# Patient Record
Sex: FEMALE | Race: WHITE | NOT HISPANIC OR LATINO | URBAN - METROPOLITAN AREA
[De-identification: names, ages, dates, MRNs, and addresses within clinical notes are randomized per-mention and may not be internally consistent; named-entity substitution may affect disease eponyms.]

---

## 2023-04-16 ENCOUNTER — INPATIENT (INPATIENT)
Facility: HOSPITAL | Age: 83
LOS: 6 days | Discharge: HOME | End: 2023-04-23
Attending: STUDENT IN AN ORGANIZED HEALTH CARE EDUCATION/TRAINING PROGRAM | Admitting: STUDENT IN AN ORGANIZED HEALTH CARE EDUCATION/TRAINING PROGRAM
Payer: SELF-PAY

## 2023-04-16 VITALS
SYSTOLIC BLOOD PRESSURE: 74 MMHG | TEMPERATURE: 100 F | DIASTOLIC BLOOD PRESSURE: 50 MMHG | HEART RATE: 97 BPM | RESPIRATION RATE: 16 BRPM | OXYGEN SATURATION: 100 %

## 2023-04-16 DIAGNOSIS — Z90.49 ACQUIRED ABSENCE OF OTHER SPECIFIED PARTS OF DIGESTIVE TRACT: Chronic | ICD-10-CM

## 2023-04-16 LAB
ALBUMIN SERPL ELPH-MCNC: 4.9 G/DL — SIGNIFICANT CHANGE UP (ref 3.3–5)
ALP SERPL-CCNC: 82 U/L — SIGNIFICANT CHANGE UP (ref 40–120)
ALT FLD-CCNC: 14 U/L — SIGNIFICANT CHANGE UP (ref 4–33)
ANION GAP SERPL CALC-SCNC: 24 MMOL/L — HIGH (ref 7–14)
AST SERPL-CCNC: 25 U/L — SIGNIFICANT CHANGE UP (ref 4–32)
BASE EXCESS BLDV CALC-SCNC: -10.6 MMOL/L — LOW (ref -2–3)
BILIRUB SERPL-MCNC: 0.6 MG/DL — SIGNIFICANT CHANGE UP (ref 0.2–1.2)
BLOOD GAS VENOUS COMPREHENSIVE RESULT: SIGNIFICANT CHANGE UP
BUN SERPL-MCNC: 47 MG/DL — HIGH (ref 7–23)
CALCIUM SERPL-MCNC: 9.6 MG/DL — SIGNIFICANT CHANGE UP (ref 8.4–10.5)
CHLORIDE BLDV-SCNC: 96 MMOL/L — SIGNIFICANT CHANGE UP (ref 96–108)
CHLORIDE SERPL-SCNC: 93 MMOL/L — LOW (ref 98–107)
CO2 BLDV-SCNC: 16.9 MMOL/L — LOW (ref 22–26)
CO2 SERPL-SCNC: 14 MMOL/L — LOW (ref 22–31)
CREAT SERPL-MCNC: 2.94 MG/DL — HIGH (ref 0.5–1.3)
EGFR: 15 ML/MIN/1.73M2 — LOW
GAS PNL BLDV: 126 MMOL/L — LOW (ref 136–145)
GAS PNL BLDV: SIGNIFICANT CHANGE UP
GLUCOSE BLDV-MCNC: 150 MG/DL — HIGH (ref 70–99)
GLUCOSE SERPL-MCNC: 159 MG/DL — HIGH (ref 70–99)
HCO3 BLDV-SCNC: 16 MMOL/L — LOW (ref 22–29)
HCT VFR BLD CALC: 41.5 % — SIGNIFICANT CHANGE UP (ref 34.5–45)
HCT VFR BLDA CALC: 44 % — SIGNIFICANT CHANGE UP (ref 34.5–46.5)
HGB BLD CALC-MCNC: 14.5 G/DL — SIGNIFICANT CHANGE UP (ref 11.7–16.1)
HGB BLD-MCNC: 13.8 G/DL — SIGNIFICANT CHANGE UP (ref 11.5–15.5)
IANC: 7.5 K/UL — HIGH (ref 1.8–7.4)
LACTATE BLDV-MCNC: 3.3 MMOL/L — HIGH (ref 0.5–2)
LIDOCAIN IGE QN: 21 U/L — SIGNIFICANT CHANGE UP (ref 7–60)
MAGNESIUM SERPL-MCNC: 1.9 MG/DL — SIGNIFICANT CHANGE UP (ref 1.6–2.6)
MCHC RBC-ENTMCNC: 29.2 PG — SIGNIFICANT CHANGE UP (ref 27–34)
MCHC RBC-ENTMCNC: 33.3 GM/DL — SIGNIFICANT CHANGE UP (ref 32–36)
MCV RBC AUTO: 87.9 FL — SIGNIFICANT CHANGE UP (ref 80–100)
PCO2 BLDV: 36 MMHG — LOW (ref 39–52)
PH BLDV: 7.25 — LOW (ref 7.32–7.43)
PHOSPHATE SERPL-MCNC: 4.7 MG/DL — HIGH (ref 2.5–4.5)
PLATELET # BLD AUTO: 127 K/UL — LOW (ref 150–400)
PO2 BLDV: 36 MMHG — SIGNIFICANT CHANGE UP (ref 25–45)
POTASSIUM BLDV-SCNC: 3.4 MMOL/L — LOW (ref 3.5–5.1)
POTASSIUM SERPL-MCNC: 3 MMOL/L — LOW (ref 3.5–5.3)
POTASSIUM SERPL-SCNC: 3 MMOL/L — LOW (ref 3.5–5.3)
PROT SERPL-MCNC: 8.2 G/DL — SIGNIFICANT CHANGE UP (ref 6–8.3)
RBC # BLD: 4.72 M/UL — SIGNIFICANT CHANGE UP (ref 3.8–5.2)
RBC # FLD: 14.5 % — SIGNIFICANT CHANGE UP (ref 10.3–14.5)
SAO2 % BLDV: 50.6 % — LOW (ref 67–88)
SODIUM SERPL-SCNC: 131 MMOL/L — LOW (ref 135–145)
WBC # BLD: 9.76 K/UL — SIGNIFICANT CHANGE UP (ref 3.8–10.5)
WBC # FLD AUTO: 9.76 K/UL — SIGNIFICANT CHANGE UP (ref 3.8–10.5)

## 2023-04-16 PROCEDURE — 99285 EMERGENCY DEPT VISIT HI MDM: CPT

## 2023-04-16 RX ORDER — POTASSIUM CHLORIDE 20 MEQ
40 PACKET (EA) ORAL ONCE
Refills: 0 | Status: COMPLETED | OUTPATIENT
Start: 2023-04-16 | End: 2023-04-16

## 2023-04-16 RX ORDER — SODIUM CHLORIDE 9 MG/ML
1000 INJECTION, SOLUTION INTRAVENOUS ONCE
Refills: 0 | Status: COMPLETED | OUTPATIENT
Start: 2023-04-16 | End: 2023-04-16

## 2023-04-16 RX ORDER — POTASSIUM CHLORIDE 20 MEQ
10 PACKET (EA) ORAL
Refills: 0 | Status: COMPLETED | OUTPATIENT
Start: 2023-04-16 | End: 2023-04-17

## 2023-04-16 RX ORDER — ONDANSETRON 8 MG/1
4 TABLET, FILM COATED ORAL ONCE
Refills: 0 | Status: COMPLETED | OUTPATIENT
Start: 2023-04-16 | End: 2023-04-16

## 2023-04-16 RX ORDER — PIPERACILLIN AND TAZOBACTAM 4; .5 G/20ML; G/20ML
3.38 INJECTION, POWDER, LYOPHILIZED, FOR SOLUTION INTRAVENOUS ONCE
Refills: 0 | Status: COMPLETED | OUTPATIENT
Start: 2023-04-16 | End: 2023-04-16

## 2023-04-16 RX ORDER — ACETAMINOPHEN 500 MG
1000 TABLET ORAL ONCE
Refills: 0 | Status: COMPLETED | OUTPATIENT
Start: 2023-04-16 | End: 2023-04-16

## 2023-04-16 RX ADMIN — Medication 400 MILLIGRAM(S): at 22:45

## 2023-04-16 RX ADMIN — PIPERACILLIN AND TAZOBACTAM 200 GRAM(S): 4; .5 INJECTION, POWDER, LYOPHILIZED, FOR SOLUTION INTRAVENOUS at 22:49

## 2023-04-16 RX ADMIN — ONDANSETRON 4 MILLIGRAM(S): 8 TABLET, FILM COATED ORAL at 22:30

## 2023-04-16 RX ADMIN — SODIUM CHLORIDE 1000 MILLILITER(S): 9 INJECTION, SOLUTION INTRAVENOUS at 22:30

## 2023-04-16 NOTE — ED ADULT NURSE NOTE - CHIEF COMPLAINT QUOTE
Pt c/o non-bloody n/v/d x2 days. Pt just returned from [place] on Saturday. Endorses weakness, decrease po intake. Denies fever, chills, sick contact. PMHx: HTN, Asthma Pt opted for daughter in law to translate in French.  Rn aware

## 2023-04-16 NOTE — ED ADULT NURSE NOTE - OBJECTIVE STATEMENT
Pt received in room 11. Pt is a&ox4, breathing spontaneously and nonlabored, ill in appearance. Pt presents with daughter in law - reports flying here from [place] yesterday, pt has has nausea/vomiting/diarrheas since. Pt has has multiple bouts of diarrhea, up to 10 times today. Pt appears weak, unable to ambulate like normal - no unilateral weakness noted. Skin appropriate for race, skin tenting present. Vitals as noted in chart, ccm shows normal sinus rhythm. Pt denies cp, sob, fevers at home, no known sick contacts. 2 IVs placed, 20G in R and L acs. Labs drawn and sent as per orders. Pt medicated as per orders. Safety precautions in place and to be maintained, will continue to monitor.

## 2023-04-16 NOTE — ED PROVIDER NOTE - OBJECTIVE STATEMENT
83F PMH HTN, asthma p/w diarrhea/vomiting since yest; multiple episodes all nonbloody in nature. Pt flew back from [place] with daughter in law who is also at bedside. No one else with similar symptoms. Pt did not eat anything out of the ordinary recently. No cp/sob, abd pain, leg swelling, urinary symptoms, personal/family hx of clots

## 2023-04-16 NOTE — ED ADULT TRIAGE NOTE - CHIEF COMPLAINT QUOTE
Pt c/o non-bloody n/v/d x2 days. Pt just returned from [place] on Saturday. Endorses weakness, decrease po intake. PMHx: HTN, Asthma Pt opted for daughter in law to translate in [place] of [place].  Rn aware Pt c/o non-bloody n/v/d x2 days. Pt just returned from [place] on Saturday. Endorses weakness, decrease po intake. Denies fever, chills, sick contact. PMHx: HTN, Asthma Pt opted for daughter in law to translate in Azeri.  Rn aware

## 2023-04-16 NOTE — ED PROVIDER NOTE - PHYSICAL EXAMINATION
Physical Exam:  Gen:  awake alert   HEENT: normal conjunctiva, oral mucosa dry  Lung: CTAB, no respiratory distress, no wheezes/rhonchi/rales B/L, speaking in full sentences  CV: RRR  Abd: soft, NT, ND, no guarding, no rigidity, no rebound tenderness  MSK: no visible deformities  Skin: Warm, well perfused, no rash, no leg swelling  ~Tariq Calvillo MD (PGY-3)

## 2023-04-16 NOTE — ED PROVIDER NOTE - ATTENDING CONTRIBUTION TO CARE
Caroline Antonio MD attending physician.  Patient is 83 years old recent return from [place] with her daughter.  Comes in with nausea vomiting and diarrhea.  Abdomen is soft without rebound or guarding was without any blood.  Blood pressure on presentation was 74/50.  Patient with dry mouth appears dehydrated after some hydration blood pressure went into the 90s.  Patient nontoxic-appearing.  She does have a new creatinine that is nearly 3.  Patient needs to stay in the hospital for new SHI in the setting of likely dehydration    I performed a history and physical exam of the patient and discussed their management with the resident and /or advanced care provider. I reviewed the resident and /or ACP's note and agree with the documented findings and plan of care. My medical decison making and observations are found above.

## 2023-04-16 NOTE — ED PROVIDER NOTE - CLINICAL SUMMARY MEDICAL DECISION MAKING FREE TEXT BOX
83F PMH HTN, asthma p/w diarrhea/vomiting since yest; multiple episodes all nonbloody in nature. VS and exam as above. ECG nondiagnostic  Concern for bacterial colitis/enteritis; abd fully NT so low concern for surgical pathology leading to symptoms. Will order sepsis/abdominal labs, ivf, ua/ucx, abx, reassess symptoms 83F PMH HTN, asthma p/w diarrhea/vomiting since yest; multiple episodes all nonbloody in nature. VS and exam as above. ECG nondiagnostic  Concern for bacterial colitis/enteritis; abd fully NT so low concern for surgical pathology leading to symptoms. Will order sepsis/abdominal labs, ivf, ua/ucx, abx, reassess symptoms    Caroline Antonio MD attending physician.  Patient is 83 years old recent return from [place] with her daughter.  Comes in with nausea vomiting and diarrhea.  Abdomen is soft without rebound or guarding was without any blood.  Blood pressure on presentation was 74/50.  Patient with dry mouth appears dehydrated after some hydration blood pressure went into the 90s.  Patient nontoxic-appearing.  She does have a new creatinine that is nearly 3.  Patient needs to stay in the hospital for new SHI in the setting of likely dehydration

## 2023-04-16 NOTE — ED PROVIDER NOTE - PROGRESS NOTE DETAILS
Rajinder BAZAN (PGY-3)  BP improved/Cr downtrending/bicarb improving/AG improving with 2L IVF. Pt getting 3rd L ivf currently and mIVF order placed. will admit to hospitalist

## 2023-04-16 NOTE — ED PROVIDER NOTE - NS ED ROS FT
CONST: +fevers, +chills  ENT: no sore throat  CV: no chest pain, no leg swelling  RESP: no shortness of breath, no cough  ABD: no abdominal pain, +nausea, +vomiting, +diarrhea  : no dysuria, no flank pain, no hematuria  MSK: no back pain, no extremity pain  SKIN:  no rash

## 2023-04-17 DIAGNOSIS — A41.9 SEPSIS, UNSPECIFIED ORGANISM: ICD-10-CM

## 2023-04-17 DIAGNOSIS — Z90.49 ACQUIRED ABSENCE OF OTHER SPECIFIED PARTS OF DIGESTIVE TRACT: ICD-10-CM

## 2023-04-17 DIAGNOSIS — J45.909 UNSPECIFIED ASTHMA, UNCOMPLICATED: ICD-10-CM

## 2023-04-17 DIAGNOSIS — I10 ESSENTIAL (PRIMARY) HYPERTENSION: ICD-10-CM

## 2023-04-17 DIAGNOSIS — Z29.9 ENCOUNTER FOR PROPHYLACTIC MEASURES, UNSPECIFIED: ICD-10-CM

## 2023-04-17 DIAGNOSIS — E87.1 HYPO-OSMOLALITY AND HYPONATREMIA: ICD-10-CM

## 2023-04-17 LAB
ALBUMIN SERPL ELPH-MCNC: 3.7 G/DL — SIGNIFICANT CHANGE UP (ref 3.3–5)
ALP SERPL-CCNC: 62 U/L — SIGNIFICANT CHANGE UP (ref 40–120)
ALT FLD-CCNC: 11 U/L — SIGNIFICANT CHANGE UP (ref 4–33)
ANION GAP SERPL CALC-SCNC: 15 MMOL/L — HIGH (ref 7–14)
ANION GAP SERPL CALC-SCNC: 18 MMOL/L — HIGH (ref 7–14)
ANION GAP SERPL CALC-SCNC: 18 MMOL/L — HIGH (ref 7–14)
ANISOCYTOSIS BLD QL: SLIGHT — SIGNIFICANT CHANGE UP
APPEARANCE UR: ABNORMAL
APTT BLD: 23.9 SEC — LOW (ref 27–36.3)
AST SERPL-CCNC: 21 U/L — SIGNIFICANT CHANGE UP (ref 4–32)
BACTERIA # UR AUTO: NEGATIVE — SIGNIFICANT CHANGE UP
BASE EXCESS BLDV CALC-SCNC: -10.1 MMOL/L — LOW (ref -2–3)
BASE EXCESS BLDV CALC-SCNC: -11.4 MMOL/L — LOW (ref -2–3)
BASOPHILS # BLD AUTO: 0 K/UL — SIGNIFICANT CHANGE UP (ref 0–0.2)
BASOPHILS # BLD AUTO: 0.03 K/UL — SIGNIFICANT CHANGE UP (ref 0–0.2)
BASOPHILS NFR BLD AUTO: 0 % — SIGNIFICANT CHANGE UP (ref 0–2)
BASOPHILS NFR BLD AUTO: 0.4 % — SIGNIFICANT CHANGE UP (ref 0–2)
BILIRUB SERPL-MCNC: 0.6 MG/DL — SIGNIFICANT CHANGE UP (ref 0.2–1.2)
BILIRUB UR-MCNC: NEGATIVE — SIGNIFICANT CHANGE UP
BLOOD GAS VENOUS COMPREHENSIVE RESULT: SIGNIFICANT CHANGE UP
BLOOD GAS VENOUS COMPREHENSIVE RESULT: SIGNIFICANT CHANGE UP
BUN SERPL-MCNC: 44 MG/DL — HIGH (ref 7–23)
BUN SERPL-MCNC: 47 MG/DL — HIGH (ref 7–23)
BUN SERPL-MCNC: 48 MG/DL — HIGH (ref 7–23)
CALCIUM SERPL-MCNC: 8.5 MG/DL — SIGNIFICANT CHANGE UP (ref 8.4–10.5)
CALCIUM SERPL-MCNC: 8.6 MG/DL — SIGNIFICANT CHANGE UP (ref 8.4–10.5)
CALCIUM SERPL-MCNC: 9 MG/DL — SIGNIFICANT CHANGE UP (ref 8.4–10.5)
CHLORIDE BLDV-SCNC: 102 MMOL/L — SIGNIFICANT CHANGE UP (ref 96–108)
CHLORIDE BLDV-SCNC: 98 MMOL/L — SIGNIFICANT CHANGE UP (ref 96–108)
CHLORIDE SERPL-SCNC: 94 MMOL/L — LOW (ref 98–107)
CHLORIDE SERPL-SCNC: 97 MMOL/L — LOW (ref 98–107)
CHLORIDE SERPL-SCNC: 98 MMOL/L — SIGNIFICANT CHANGE UP (ref 98–107)
CO2 BLDV-SCNC: 16.8 MMOL/L — LOW (ref 22–26)
CO2 BLDV-SCNC: 17.3 MMOL/L — LOW (ref 22–26)
CO2 SERPL-SCNC: 11 MMOL/L — LOW (ref 22–31)
CO2 SERPL-SCNC: 15 MMOL/L — LOW (ref 22–31)
CO2 SERPL-SCNC: 15 MMOL/L — LOW (ref 22–31)
COLOR SPEC: YELLOW — SIGNIFICANT CHANGE UP
CREAT ?TM UR-MCNC: 120 MG/DL — SIGNIFICANT CHANGE UP
CREAT SERPL-MCNC: 2.07 MG/DL — HIGH (ref 0.5–1.3)
CREAT SERPL-MCNC: 2.57 MG/DL — HIGH (ref 0.5–1.3)
CREAT SERPL-MCNC: 2.86 MG/DL — HIGH (ref 0.5–1.3)
DIFF PNL FLD: NEGATIVE — SIGNIFICANT CHANGE UP
EGFR: 16 ML/MIN/1.73M2 — LOW
EGFR: 18 ML/MIN/1.73M2 — LOW
EGFR: 23 ML/MIN/1.73M2 — LOW
EOSINOPHIL # BLD AUTO: 0 K/UL — SIGNIFICANT CHANGE UP (ref 0–0.5)
EOSINOPHIL # BLD AUTO: 0 K/UL — SIGNIFICANT CHANGE UP (ref 0–0.5)
EOSINOPHIL NFR BLD AUTO: 0 % — SIGNIFICANT CHANGE UP (ref 0–6)
EOSINOPHIL NFR BLD AUTO: 0 % — SIGNIFICANT CHANGE UP (ref 0–6)
EPI CELLS # UR: 1 /HPF — SIGNIFICANT CHANGE UP (ref 0–5)
FLUAV AG NPH QL: SIGNIFICANT CHANGE UP
FLUBV AG NPH QL: SIGNIFICANT CHANGE UP
GAS PNL BLDV: 127 MMOL/L — LOW (ref 136–145)
GAS PNL BLDV: 127 MMOL/L — LOW (ref 136–145)
GAS PNL BLDV: SIGNIFICANT CHANGE UP
GIANT PLATELETS BLD QL SMEAR: PRESENT — SIGNIFICANT CHANGE UP
GLUCOSE BLDV-MCNC: 125 MG/DL — HIGH (ref 70–99)
GLUCOSE BLDV-MCNC: 144 MG/DL — HIGH (ref 70–99)
GLUCOSE SERPL-MCNC: 124 MG/DL — HIGH (ref 70–99)
GLUCOSE SERPL-MCNC: 130 MG/DL — HIGH (ref 70–99)
GLUCOSE SERPL-MCNC: 152 MG/DL — HIGH (ref 70–99)
GLUCOSE UR QL: NEGATIVE — SIGNIFICANT CHANGE UP
GRAM STN FLD: SIGNIFICANT CHANGE UP
GRAM STN FLD: SIGNIFICANT CHANGE UP
HCO3 BLDV-SCNC: 16 MMOL/L — LOW (ref 22–29)
HCO3 BLDV-SCNC: 16 MMOL/L — LOW (ref 22–29)
HCT VFR BLD CALC: 36.5 % — SIGNIFICANT CHANGE UP (ref 34.5–45)
HCT VFR BLDA CALC: 34 % — LOW (ref 34.5–46.5)
HCT VFR BLDA CALC: 37 % — SIGNIFICANT CHANGE UP (ref 34.5–46.5)
HGB BLD CALC-MCNC: 11.4 G/DL — LOW (ref 11.7–16.1)
HGB BLD CALC-MCNC: 12.2 G/DL — SIGNIFICANT CHANGE UP (ref 11.7–16.1)
HGB BLD-MCNC: 11.8 G/DL — SIGNIFICANT CHANGE UP (ref 11.5–15.5)
HYALINE CASTS # UR AUTO: 2 /LPF — SIGNIFICANT CHANGE UP (ref 0–7)
IANC: 4.59 K/UL — SIGNIFICANT CHANGE UP (ref 1.8–7.4)
IMM GRANULOCYTES NFR BLD AUTO: 0.3 % — SIGNIFICANT CHANGE UP (ref 0–0.9)
INR BLD: 1.09 RATIO — SIGNIFICANT CHANGE UP (ref 0.88–1.16)
KETONES UR-MCNC: NEGATIVE — SIGNIFICANT CHANGE UP
LACTATE BLDV-MCNC: 2.1 MMOL/L — HIGH (ref 0.5–2)
LACTATE BLDV-MCNC: 3.2 MMOL/L — HIGH (ref 0.5–2)
LEUKOCYTE ESTERASE UR-ACNC: NEGATIVE — SIGNIFICANT CHANGE UP
LYMPHOCYTES # BLD AUTO: 1.1 K/UL — SIGNIFICANT CHANGE UP (ref 1–3.3)
LYMPHOCYTES # BLD AUTO: 1.53 K/UL — SIGNIFICANT CHANGE UP (ref 1–3.3)
LYMPHOCYTES # BLD AUTO: 11.3 % — LOW (ref 13–44)
LYMPHOCYTES # BLD AUTO: 22.9 % — SIGNIFICANT CHANGE UP (ref 13–44)
MACROCYTES BLD QL: SLIGHT — SIGNIFICANT CHANGE UP
MAGNESIUM SERPL-MCNC: 1.8 MG/DL — SIGNIFICANT CHANGE UP (ref 1.6–2.6)
MANUAL SMEAR VERIFICATION: SIGNIFICANT CHANGE UP
MCHC RBC-ENTMCNC: 28.5 PG — SIGNIFICANT CHANGE UP (ref 27–34)
MCHC RBC-ENTMCNC: 32.3 GM/DL — SIGNIFICANT CHANGE UP (ref 32–36)
MCV RBC AUTO: 88.2 FL — SIGNIFICANT CHANGE UP (ref 80–100)
METAMYELOCYTES # FLD: 1.7 % — HIGH (ref 0–1)
METHOD TYPE: SIGNIFICANT CHANGE UP
MONOCYTES # BLD AUTO: 0.51 K/UL — SIGNIFICANT CHANGE UP (ref 0–0.9)
MONOCYTES # BLD AUTO: 0.85 K/UL — SIGNIFICANT CHANGE UP (ref 0–0.9)
MONOCYTES NFR BLD AUTO: 7.6 % — SIGNIFICANT CHANGE UP (ref 2–14)
MONOCYTES NFR BLD AUTO: 8.7 % — SIGNIFICANT CHANGE UP (ref 2–14)
NEUTROPHILS # BLD AUTO: 4.59 K/UL — SIGNIFICANT CHANGE UP (ref 1.8–7.4)
NEUTROPHILS # BLD AUTO: 6.96 K/UL — SIGNIFICANT CHANGE UP (ref 1.8–7.4)
NEUTROPHILS NFR BLD AUTO: 40.9 % — LOW (ref 43–77)
NEUTROPHILS NFR BLD AUTO: 68.8 % — SIGNIFICANT CHANGE UP (ref 43–77)
NEUTS BAND # BLD: 30.4 % — CRITICAL HIGH (ref 0–6)
NITRITE UR-MCNC: NEGATIVE — SIGNIFICANT CHANGE UP
NRBC # BLD: 0 /100 WBCS — SIGNIFICANT CHANGE UP (ref 0–0)
NRBC # FLD: 0 K/UL — SIGNIFICANT CHANGE UP (ref 0–0)
OSMOLALITY UR: 431 MOSM/KG — SIGNIFICANT CHANGE UP (ref 50–1200)
PCO2 BLDV: 36 MMHG — LOW (ref 39–52)
PCO2 BLDV: 38 MMHG — LOW (ref 39–52)
PH BLDV: 7.22 — LOW (ref 7.32–7.43)
PH BLDV: 7.26 — LOW (ref 7.32–7.43)
PH UR: 6 — SIGNIFICANT CHANGE UP (ref 5–8)
PHOSPHATE SERPL-MCNC: 4.6 MG/DL — HIGH (ref 2.5–4.5)
PLAT MORPH BLD: NORMAL — SIGNIFICANT CHANGE UP
PLATELET # BLD AUTO: 109 K/UL — LOW (ref 150–400)
PLATELET COUNT - ESTIMATE: ABNORMAL
PO2 BLDV: 32 MMHG — SIGNIFICANT CHANGE UP (ref 25–45)
PO2 BLDV: 60 MMHG — HIGH (ref 25–45)
POLYCHROMASIA BLD QL SMEAR: SLIGHT — SIGNIFICANT CHANGE UP
POTASSIUM BLDV-SCNC: 3.1 MMOL/L — LOW (ref 3.5–5.1)
POTASSIUM BLDV-SCNC: 3.6 MMOL/L — SIGNIFICANT CHANGE UP (ref 3.5–5.1)
POTASSIUM SERPL-MCNC: 3 MMOL/L — LOW (ref 3.5–5.3)
POTASSIUM SERPL-MCNC: 3.1 MMOL/L — LOW (ref 3.5–5.3)
POTASSIUM SERPL-MCNC: 3.5 MMOL/L — SIGNIFICANT CHANGE UP (ref 3.5–5.3)
POTASSIUM SERPL-SCNC: 3 MMOL/L — LOW (ref 3.5–5.3)
POTASSIUM SERPL-SCNC: 3.1 MMOL/L — LOW (ref 3.5–5.3)
POTASSIUM SERPL-SCNC: 3.5 MMOL/L — SIGNIFICANT CHANGE UP (ref 3.5–5.3)
PROT SERPL-MCNC: 6.6 G/DL — SIGNIFICANT CHANGE UP (ref 6–8.3)
PROT UR-MCNC: ABNORMAL
PROTHROM AB SERPL-ACNC: 12.7 SEC — SIGNIFICANT CHANGE UP (ref 10.5–13.4)
RBC # BLD: 4.14 M/UL — SIGNIFICANT CHANGE UP (ref 3.8–5.2)
RBC # FLD: 14.4 % — SIGNIFICANT CHANGE UP (ref 10.3–14.5)
RBC BLD AUTO: ABNORMAL
RBC CASTS # UR COMP ASSIST: 3 /HPF — SIGNIFICANT CHANGE UP (ref 0–4)
RSV RNA NPH QL NAA+NON-PROBE: SIGNIFICANT CHANGE UP
SALMONELLA DNA BLD POS QL NAA+NON-PROBE: SIGNIFICANT CHANGE UP
SAO2 % BLDV: 51.9 % — LOW (ref 67–88)
SAO2 % BLDV: 89.1 % — HIGH (ref 67–88)
SARS-COV-2 RNA SPEC QL NAA+PROBE: SIGNIFICANT CHANGE UP
SODIUM SERPL-SCNC: 127 MMOL/L — LOW (ref 135–145)
SODIUM UR-SCNC: 26 MMOL/L — SIGNIFICANT CHANGE UP
SP GR SPEC: 1.02 — SIGNIFICANT CHANGE UP (ref 1.01–1.05)
SPECIMEN SOURCE: SIGNIFICANT CHANGE UP
SPECIMEN SOURCE: SIGNIFICANT CHANGE UP
UROBILINOGEN FLD QL: SIGNIFICANT CHANGE UP
VARIANT LYMPHS # BLD: 7 % — HIGH (ref 0–6)
WBC # BLD: 6.68 K/UL — SIGNIFICANT CHANGE UP (ref 3.8–10.5)
WBC # FLD AUTO: 6.68 K/UL — SIGNIFICANT CHANGE UP (ref 3.8–10.5)
WBC UR QL: 6 /HPF — HIGH (ref 0–5)

## 2023-04-17 PROCEDURE — 12345: CPT | Mod: NC

## 2023-04-17 PROCEDURE — 99223 1ST HOSP IP/OBS HIGH 75: CPT

## 2023-04-17 PROCEDURE — 99283 EMERGENCY DEPT VISIT LOW MDM: CPT | Mod: GC

## 2023-04-17 PROCEDURE — 74176 CT ABD & PELVIS W/O CONTRAST: CPT | Mod: 26

## 2023-04-17 RX ORDER — DORZOLAMIDE HYDROCHLORIDE TIMOLOL MALEATE 20; 5 MG/ML; MG/ML
1 SOLUTION/ DROPS OPHTHALMIC
Refills: 0 | Status: DISCONTINUED | OUTPATIENT
Start: 2023-04-17 | End: 2023-04-23

## 2023-04-17 RX ORDER — DORZOLAMIDE HYDROCHLORIDE TIMOLOL MALEATE 20; 5 MG/ML; MG/ML
1 SOLUTION/ DROPS OPHTHALMIC
Refills: 0 | DISCHARGE

## 2023-04-17 RX ORDER — ACETAMINOPHEN 500 MG
650 TABLET ORAL EVERY 6 HOURS
Refills: 0 | Status: DISCONTINUED | OUTPATIENT
Start: 2023-04-17 | End: 2023-04-23

## 2023-04-17 RX ORDER — POTASSIUM CHLORIDE 20 MEQ
40 PACKET (EA) ORAL EVERY 4 HOURS
Refills: 0 | Status: DISCONTINUED | OUTPATIENT
Start: 2023-04-17 | End: 2023-04-17

## 2023-04-17 RX ORDER — VANCOMYCIN HCL 1 G
1250 VIAL (EA) INTRAVENOUS ONCE
Refills: 0 | Status: COMPLETED | OUTPATIENT
Start: 2023-04-17 | End: 2023-04-17

## 2023-04-17 RX ORDER — VANCOMYCIN HCL 1 G
1250 VIAL (EA) INTRAVENOUS EVERY 12 HOURS
Refills: 0 | Status: DISCONTINUED | OUTPATIENT
Start: 2023-04-17 | End: 2023-04-17

## 2023-04-17 RX ORDER — MONTELUKAST 4 MG/1
10 TABLET, CHEWABLE ORAL DAILY
Refills: 0 | Status: DISCONTINUED | OUTPATIENT
Start: 2023-04-17 | End: 2023-04-23

## 2023-04-17 RX ORDER — SODIUM CHLORIDE 9 MG/ML
500 INJECTION INTRAMUSCULAR; INTRAVENOUS; SUBCUTANEOUS ONCE
Refills: 0 | Status: COMPLETED | OUTPATIENT
Start: 2023-04-17 | End: 2023-04-17

## 2023-04-17 RX ORDER — DICLOFENAC SODIUM 75 MG/1
1 TABLET, DELAYED RELEASE ORAL
Refills: 0 | DISCHARGE

## 2023-04-17 RX ORDER — POTASSIUM CHLORIDE 20 MEQ
10 PACKET (EA) ORAL
Refills: 0 | Status: COMPLETED | OUTPATIENT
Start: 2023-04-17 | End: 2023-04-18

## 2023-04-17 RX ORDER — SODIUM CHLORIDE 9 MG/ML
1000 INJECTION, SOLUTION INTRAVENOUS
Refills: 0 | Status: DISCONTINUED | OUTPATIENT
Start: 2023-04-17 | End: 2023-04-18

## 2023-04-17 RX ORDER — IPRATROPIUM/ALBUTEROL SULFATE 18-103MCG
3 AEROSOL WITH ADAPTER (GRAM) INHALATION EVERY 6 HOURS
Refills: 0 | Status: DISCONTINUED | OUTPATIENT
Start: 2023-04-17 | End: 2023-04-23

## 2023-04-17 RX ORDER — LANOLIN ALCOHOL/MO/W.PET/CERES
3 CREAM (GRAM) TOPICAL AT BEDTIME
Refills: 0 | Status: DISCONTINUED | OUTPATIENT
Start: 2023-04-17 | End: 2023-04-23

## 2023-04-17 RX ORDER — PIPERACILLIN AND TAZOBACTAM 4; .5 G/20ML; G/20ML
3.38 INJECTION, POWDER, LYOPHILIZED, FOR SOLUTION INTRAVENOUS EVERY 12 HOURS
Refills: 0 | Status: DISCONTINUED | OUTPATIENT
Start: 2023-04-17 | End: 2023-04-18

## 2023-04-17 RX ORDER — ONDANSETRON 8 MG/1
4 TABLET, FILM COATED ORAL EVERY 8 HOURS
Refills: 0 | Status: DISCONTINUED | OUTPATIENT
Start: 2023-04-17 | End: 2023-04-23

## 2023-04-17 RX ORDER — SODIUM CHLORIDE 9 MG/ML
1000 INJECTION, SOLUTION INTRAVENOUS ONCE
Refills: 0 | Status: COMPLETED | OUTPATIENT
Start: 2023-04-17 | End: 2023-04-17

## 2023-04-17 RX ORDER — HEPARIN SODIUM 5000 [USP'U]/ML
5000 INJECTION INTRAVENOUS; SUBCUTANEOUS EVERY 8 HOURS
Refills: 0 | Status: DISCONTINUED | OUTPATIENT
Start: 2023-04-17 | End: 2023-04-23

## 2023-04-17 RX ORDER — MONTELUKAST 4 MG/1
1 TABLET, CHEWABLE ORAL
Refills: 0 | DISCHARGE

## 2023-04-17 RX ADMIN — Medication 3 MILLILITER(S): at 10:02

## 2023-04-17 RX ADMIN — Medication 3 MILLILITER(S): at 05:09

## 2023-04-17 RX ADMIN — HEPARIN SODIUM 5000 UNIT(S): 5000 INJECTION INTRAVENOUS; SUBCUTANEOUS at 13:06

## 2023-04-17 RX ADMIN — Medication 166.67 MILLIGRAM(S): at 05:48

## 2023-04-17 RX ADMIN — PIPERACILLIN AND TAZOBACTAM 25 GRAM(S): 4; .5 INJECTION, POWDER, LYOPHILIZED, FOR SOLUTION INTRAVENOUS at 17:32

## 2023-04-17 RX ADMIN — Medication 40 MILLIEQUIVALENT(S): at 00:34

## 2023-04-17 RX ADMIN — Medication 100 MILLIEQUIVALENT(S): at 03:05

## 2023-04-17 RX ADMIN — Medication 40 MILLIEQUIVALENT(S): at 17:38

## 2023-04-17 RX ADMIN — HEPARIN SODIUM 5000 UNIT(S): 5000 INJECTION INTRAVENOUS; SUBCUTANEOUS at 07:21

## 2023-04-17 RX ADMIN — PIPERACILLIN AND TAZOBACTAM 25 GRAM(S): 4; .5 INJECTION, POWDER, LYOPHILIZED, FOR SOLUTION INTRAVENOUS at 07:21

## 2023-04-17 RX ADMIN — MONTELUKAST 10 MILLIGRAM(S): 4 TABLET, CHEWABLE ORAL at 11:45

## 2023-04-17 RX ADMIN — SODIUM CHLORIDE 1000 MILLILITER(S): 9 INJECTION, SOLUTION INTRAVENOUS at 00:34

## 2023-04-17 RX ADMIN — Medication 100 MILLIEQUIVALENT(S): at 00:34

## 2023-04-17 RX ADMIN — Medication 3 MILLILITER(S): at 16:38

## 2023-04-17 RX ADMIN — Medication 100 MILLIEQUIVALENT(S): at 22:23

## 2023-04-17 RX ADMIN — DORZOLAMIDE HYDROCHLORIDE TIMOLOL MALEATE 1 DROP(S): 20; 5 SOLUTION/ DROPS OPHTHALMIC at 18:14

## 2023-04-17 RX ADMIN — SODIUM CHLORIDE 1000 MILLILITER(S): 9 INJECTION, SOLUTION INTRAVENOUS at 00:03

## 2023-04-17 RX ADMIN — SODIUM CHLORIDE 75 MILLILITER(S): 9 INJECTION, SOLUTION INTRAVENOUS at 02:36

## 2023-04-17 RX ADMIN — Medication 100 MILLIEQUIVALENT(S): at 01:56

## 2023-04-17 RX ADMIN — SODIUM CHLORIDE 750 MILLILITER(S): 9 INJECTION INTRAMUSCULAR; INTRAVENOUS; SUBCUTANEOUS at 05:09

## 2023-04-17 RX ADMIN — Medication 3 MILLILITER(S): at 22:51

## 2023-04-17 NOTE — CONSULT NOTE ADULT - ATTENDING COMMENTS
83-year-old woman with a past medical history of asthma hypertension presents with diarrhea and vomiting x2 days.  MICU consulted for hypotension.  Patient recently returned from [place].   The day she arrived she started having multiple episodes of emesis and diarrhea.  Emesis and diarrhea both nonbloody.  Diarrhea is described as brown.  Patient was having difficulty tolerating p.o. intake and so family brought her to the emergency room.      Labs are significant for leukocytosis with significant bandemia.  She also has a metabolic acidosis, hyponatremia and SHI.       On exam she is awake, alert, conversing fluently and Polish.  Of note preferred her daughter to translate in English.  She is breathing normally on room air.  No signs of respiratory distress.  Abdomen is soft, nontender nondistended.  No rebound or guarding.  Extremities are warm.  Her blood pressure upon our evaluation remained 90s to 100s systolic.  Map was always greater than 65.      CAT scans were done pending official read.   Her presentation is concerning for sepsis likely GI source.  While she was initially hypotensive her blood pressure has improved status post 3.5 L IV fluid in the emergency room.  Of note nurse and family both attest that even while admitted she is continue to have copious diarrhea.  Suspect she is hypovolemic.  Would continue IV fluid repletion.  Agree with broad-spectrum antibiotics.      At this time she does not have any ICU requirements.   Please reconsult ICU if clinical condition worsens or changes.

## 2023-04-17 NOTE — PROGRESS NOTE ADULT - PROBLEM SELECTOR PLAN 1
-Dx: Septic shock 2/2 infection vs hypovolemia  -s/p 4 x 1L bolus, MAP 50-60 despite aggressive IVF given earlier in ED  -Noted lactic acidosis   -s/p vanc/zosyn in ED  -MICU consulted for hypotension; at this time not MICU candidate  -CT A/P w/o colitis, noted pelvic fluid mass (potential infectious vs neoplastic source)  > c/w zosyn  > f/u MRSA, UA, stool studies  > consulted gyn for pelvic fluid collection    > ordered pelvic US per recs -Dx: Septic shock 2/2 infection vs hypovolemia  -s/p 4 x 1L bolus, MAP 50-60 despite aggressive IVF given earlier in ED  -Noted lactic acidosis   -s/p vanc/zosyn in ED  -MICU consulted for hypotension; at this time not MICU candidate  -CT A/P w/o colitis, noted pelvic fluid mass (potential infectious vs neoplastic source)  > c/w zosyn  > f/u MRSA, UCx, GI stool PCR, BCx, UA  > consulted gyn for pelvic fluid collection    > ordered pelvic US per recs

## 2023-04-17 NOTE — PROGRESS NOTE ADULT - SUBJECTIVE AND OBJECTIVE BOX
Robi Garcia  PGY-1 Resident Physician     Patient is a 83y old  Female who presents with a chief complaint of Diarrhea, vomiting (17 Apr 2023 06:52)    SUBJECTIVE / OVERNIGHT EVENTS:  NAEON. Daughter by bedside to translate. Pt denies confusion, fatigue, CP, SOB, GI distress, LE swelling. Remaining ROS (-).     MEDICATIONS  (STANDING):  albuterol/ipratropium for Nebulization 3 milliLiter(s) Nebulizer every 6 hours  dextrose 5% + lactated ringers. 1000 milliLiter(s) (75 mL/Hr) IV Continuous <Continuous>  dorzolamide 2%/timolol 0.5% Ophthalmic Solution 1 Drop(s) Both EYES two times a day  heparin   Injectable 5000 Unit(s) SubCutaneous every 8 hours  montelukast 10 milliGRAM(s) Oral daily  piperacillin/tazobactam IVPB.. 3.375 Gram(s) IV Intermittent every 12 hours    MEDICATIONS  (PRN):  acetaminophen     Tablet .. 650 milliGRAM(s) Oral every 6 hours PRN Temp greater or equal to 38C (100.4F), Mild Pain (1 - 3)  aluminum hydroxide/magnesium hydroxide/simethicone Suspension 30 milliLiter(s) Oral every 4 hours PRN Dyspepsia  melatonin 3 milliGRAM(s) Oral at bedtime PRN Insomnia  ondansetron Injectable 4 milliGRAM(s) IV Push every 8 hours PRN Nausea and/or Vomiting    Allergies    No Known Allergies    Intolerances        Vital Signs Last 24 Hrs  T(C): 36.3 (17 Apr 2023 14:00), Max: 38.3 (16 Apr 2023 22:30)  T(F): 97.3 (17 Apr 2023 14:00), Max: 100.9 (16 Apr 2023 22:30)  HR: 82 (17 Apr 2023 14:00) (73 - 101)  BP: 102/50 (17 Apr 2023 14:00) (74/50 - 113/47)  BP(mean): 67 (17 Apr 2023 05:47) (56 - 67)  RR: 18 (17 Apr 2023 14:00) (16 - 18)  SpO2: 96% (17 Apr 2023 14:00) (95% - 100%)    Parameters below as of 17 Apr 2023 14:00  Patient On (Oxygen Delivery Method): room air      Daily     Daily   I&O's Summary      Physical Exam  GENERAL: NAD. Well-developed.  NEUROLOGICAL: A&O x 4. CN2-12. Strength, Sensation, ROM wnl. Brachial, ankle, babinski reflex wnl. Cerebellar signs (FTN) wnl. Gait appreciated.                  NECK: No lymphadenopathy. Supple, symmetric and without tracheal deviation.   CARDIAC: RRR w/ normal S1 & S2. No murmurs, rubs. & gallops. Radial & dorsal pedis pulses intact. No carotid bruits.   PULMONARY: CTA b/l w/o accessory muscle use.   GI: Soft, NT, ND, no rebound, no guarding. NABS in 4 quads.   RENAL: No lower extremity edema. No CVA tenderness.       DIAGNOSTICS:                         11.8   6.68  )-----------( 109      ( 17 Apr 2023 05:53 )             36.5     Hgb Trend: 11.8<--, 13.8<--  04-17    127<L>  |  98  |  48<H>  ----------------------------<  130<H>  3.5   |  11<L>  |  2.57<H>    Ca    8.5      17 Apr 2023 05:53  Phos  4.6     04-17  Mg     1.80     04-17    TPro  6.6  /  Alb  3.7  /  TBili  0.6  /  DBili  x   /  AST  21  /  ALT  11  /  AlkPhos  62  04-17    CAPILLARY BLOOD GLUCOSE      POCT Blood Glucose.: 156 mg/dL (16 Apr 2023 21:28)    Creatinine Trend: 2.57<--, 2.86<--, 2.94<--  LIVER FUNCTIONS - ( 17 Apr 2023 05:53 )  Alb: 3.7 g/dL / Pro: 6.6 g/dL / ALK PHOS: 62 U/L / ALT: 11 U/L / AST: 21 U/L / GGT: x           PT/INR - ( 17 Apr 2023 05:53 )   PT: 12.7 sec;   INR: 1.09 ratio         PTT - ( 17 Apr 2023 05:53 )  PTT:23.9 sec

## 2023-04-17 NOTE — PATIENT PROFILE ADULT - FALL HARM RISK - HARM RISK INTERVENTIONS

## 2023-04-17 NOTE — H&P ADULT - NSHPREVIEWOFSYSTEMS_GEN_ALL_CORE
REVIEW OF SYSTEMS:    CONSTITUTIONAL: +++weakness, but no fevers or chills  EYES/ENT: No visual changes;  No dysphagia  NECK: No pain or stiffness  RESPIRATORY: No cough, wheezing, hemoptysis; No shortness of breath  CARDIOVASCULAR: No chest pain or palpitations; No lower extremity edema  GASTROINTESTINAL: No abdominal or epigastric pain. ++nausea, +++vomiting, ---hematemesis; No diarrhea or constipation. No melena or hematochezia.  GENITOURINARY: No dysuria, frequency or hematuria  NEUROLOGICAL: No numbness or weakness  SKIN: No itching, burning, rashes, or lesions   PSYCH: No auditory or visual hallucinations  All other review of systems is negative unless indicated above.

## 2023-04-17 NOTE — CONSULT NOTE ADULT - SUBJECTIVE AND OBJECTIVE BOX
CHIEF COMPLAINT:    HPI:  83F PMH HTN and asthma presents with acute onset diarrhea and vomiting each 10 times already since yesterday (Sunday). Patient arrived to [place] from [place] on Saturday. She was otherwise in her usual state of health and rest of family was doing ok. Daughter in law at bedside to assist with translation. Patient did not eat anything out of the ordinary recently and suspects it was something in the airplane. Her vomitus was nonbloody and nonbilious and has passed gas yesterday. Patient denies chest pain, dyspnea, wheezing, cough, fever, chills, leg swelling, abdominal pain or headaches.  In the ED, 74/50 -> 94/48, T 100.9. 74/50 -> 94/48, T 100.9, bandemia of 33%, lactic acidosis without much response to IVF. BP has not improved at all however patient stating she feels well currently. Given Zosyn in ED. CT A/P pending     MICU was consulted regarding patient's hypotension i/s/o vomiting/diarrhea, poss. infectious etiology      PAST MEDICAL & SURGICAL HISTORY:  HTN (hypertension)      Asthma      S/P cholecystectomy          FAMILY HISTORY:  No pertinent family history in first degree relatives        SOCIAL HISTORY:  Smoking: Former smoker  EtOH Use:  Marital Status:    Allergies    No Known Allergies    Intolerances        HOME MEDICATIONS:    REVIEW OF SYSTEMS:    CONSTITUTIONAL: No weakness, fevers, +chills  EYES/ENT: No visual changes;  No vertigo or throat pain   NECK: No pain or stiffness  RESPIRATORY: No cough, wheezing, hemoptysis; No shortness of breath  CARDIOVASCULAR: No chest pain or palpitations  GASTROINTESTINAL: No abdominal or epigastric pain. No nausea, vomiting, or hematemesis; +diarrhea NBNB, No melena or hematochezia.  GENITOURINARY: No dysuria, frequency or hematuria  NEUROLOGICAL: No numbness or weakness  SKIN: No itching, rashes      OBJECTIVE:  ICU Vital Signs Last 24 Hrs  T(C): 36.1 (17 Apr 2023 06:49), Max: 38.3 (16 Apr 2023 22:30)  T(F): 97 (17 Apr 2023 06:49), Max: 100.9 (16 Apr 2023 22:30)  HR: 101 (17 Apr 2023 06:49) (73 - 101)  BP: 113/47 (17 Apr 2023 06:49) (74/50 - 113/47)  BP(mean): 67 (17 Apr 2023 05:47) (56 - 67)  ABP: --  ABP(mean): --  RR: 18 (17 Apr 2023 06:49) (16 - 18)  SpO2: 100% (17 Apr 2023 06:49) (95% - 100%)    O2 Parameters below as of 17 Apr 2023 06:49  Patient On (Oxygen Delivery Method): room air              CAPILLARY BLOOD GLUCOSE      POCT Blood Glucose.: 156 mg/dL (16 Apr 2023 21:28)      VITALS:   T(C): 36.1 (04-17-23 @ 06:49), Max: 38.3 (04-16-23 @ 22:30)  HR: 101 (04-17-23 @ 06:49) (73 - 101)  BP: 113/47 (04-17-23 @ 06:49) (74/50 - 113/47)  RR: 18 (04-17-23 @ 06:49) (16 - 18)  SpO2: 100% (04-17-23 @ 06:49) (95% - 100%)    GENERAL: NAD, lying in bed comfortably  HEAD:  Atraumatic, normocephalic  EYES: EOMI, PERRLA, conjunctiva and sclera clear  ENT: Moist mucous membranes  NECK: Supple, no JVD  HEART: Regular rate and rhythm, no murmurs, rubs, or gallops  LUNGS: Unlabored respirations.  Clear to auscultation bilaterally, no crackles, wheezing, or rhonchi  ABDOMEN: Soft, nontender, nondistended, +BS  EXTREMITIES: 2+ peripheral pulses bilaterally. No clubbing, cyanosis, or edema  NERVOUS SYSTEM:  A&Ox3, no focal deficits   SKIN: No rashes or lesions    HOSPITAL MEDICATIONS:  MEDICATIONS  (STANDING):  albuterol/ipratropium for Nebulization 3 milliLiter(s) Nebulizer every 6 hours  dextrose 5% + lactated ringers. 1000 milliLiter(s) (75 mL/Hr) IV Continuous <Continuous>  dorzolamide 2%/timolol 0.5% Ophthalmic Solution 1 Drop(s) Both EYES two times a day  heparin   Injectable 5000 Unit(s) SubCutaneous every 8 hours  montelukast 10 milliGRAM(s) Oral daily  piperacillin/tazobactam IVPB.. 3.375 Gram(s) IV Intermittent every 12 hours    MEDICATIONS  (PRN):  acetaminophen     Tablet .. 650 milliGRAM(s) Oral every 6 hours PRN Temp greater or equal to 38C (100.4F), Mild Pain (1 - 3)  aluminum hydroxide/magnesium hydroxide/simethicone Suspension 30 milliLiter(s) Oral every 4 hours PRN Dyspepsia  melatonin 3 milliGRAM(s) Oral at bedtime PRN Insomnia  ondansetron Injectable 4 milliGRAM(s) IV Push every 8 hours PRN Nausea and/or Vomiting      LABS:                        11.8   6.68  )-----------( 109      ( 17 Apr 2023 05:53 )             36.5     04-17    127<L>  |  98  |  48<H>  ----------------------------<  130<H>  3.5   |  11<L>  |  2.57<H>    Ca    8.5      17 Apr 2023 05:53  Phos  4.6     04-17  Mg     1.80     04-17    TPro  6.6  /  Alb  3.7  /  TBili  0.6  /  DBili  x   /  AST  21  /  ALT  11  /  AlkPhos  62  04-17    PT/INR - ( 17 Apr 2023 05:53 )   PT: 12.7 sec;   INR: 1.09 ratio         PTT - ( 17 Apr 2023 05:53 )  PTT:23.9 sec      Venous Blood Gas:  04-17 @ 05:53  7.22/38/60/16/89.1  VBG Lactate: 2.1  Venous Blood Gas:  04-17 @ 00:09  7.26/36/32/16/51.9  VBG Lactate: 3.2  Venous Blood Gas:  04-16 @ 21:58  7.25/36/36/16/50.6  VBG Lactate: 3.3      MICROBIOLOGY:     RADIOLOGY:  [ ] Reviewed and interpreted by me    EKG:

## 2023-04-17 NOTE — H&P ADULT - PROBLEM SELECTOR PLAN 1
Patient's BP barely responded to IVF and currently between MAP 50-60 despite aggressive IVF given earlier in ED. minor improvements in lactic acidosis. Patient otherwise appears cognitively intact with good distal perfusion  -Pending CT A/P to isolate source of sepsis but likely GI in nature and possibly viral but cannot rule out salmonella vs staph  -Will start Vanco and continue Zosyn renally dosed  -Continue IVF  -MICU consulted for pressor support  -Repeat Labs including lactic acidosis

## 2023-04-17 NOTE — H&P ADULT - NSHPLABSRESULTS_GEN_ALL_CORE
04-17    127<L>  |  94<L>  |  47<H>  ----------------------------<  152<H>  3.1<L>   |  15<L>  |  2.86<H>    Ca    8.6      17 Apr 2023 00:09  Phos  4.7     04-16  Mg     1.90     04-16    TPro  8.2  /  Alb  4.9  /  TBili  0.6  /  DBili  x   /  AST  25  /  ALT  14  /  AlkPhos  82  04-16                        13.8   9.76  )-----------( 127      ( 16 Apr 2023 21:58 )             41.5     LIVER FUNCTIONS - ( 16 Apr 2023 21:58 )  Alb: 4.9 g/dL / Pro: 8.2 g/dL / ALK PHOS: 82 U/L / ALT: 14 U/L / AST: 25 U/L / GGT: x           EKG: NSR, [id number]    Image: Pending CT A/P

## 2023-04-17 NOTE — ED ADULT NURSE REASSESSMENT NOTE - NS ED NURSE REASSESS COMMENT FT1
MICU at bedside for evaluation. Patient rejected to go to MICU. tolerating fluids well. with improvement to BP. Antibiotics given as per MD order. repeat Stat labs sent. No complaints of chest pain, headache, nausea, dizziness, vomiting  SOB, fever, chills verbalized..
Patient received back from CT. Hypotensive as per flowsheet. MD dye made aware, Medications and fluids given as per MD order. No complaints of chest pain, headache, nausea, dizziness, vomiting  SOB, fever, chills verbalized. maintenance fluids infusing. NSR on cardiac monitor. safety measures in place awaiting further orders
Patient resting comfortably at this time. awaiting CT scan prior to admission bed upstairs. fluids running, respirations even and unlabored, appears in NAD. NSR on cardiac monitor.
Pt admitted to medicine. Pending inpatient bed assignment.
Report received from MELINA Martinez. Pt A&Ox4, respirations equal and unlabored. Pt hypotensive. MD aware. IVF infusing. NSR on cardiac monitor.  Pt denies dizziness, blurry vision, lightheadedness. No acute distress noted. Pending CT scan. Safety maintained.

## 2023-04-17 NOTE — H&P ADULT - PROBLEM SELECTOR PLAN 5
FENP: No IVF, Lytes PRN, Clear diet, heparin ppx    I explained to the family at bedside that her BP is really low and that she is in shock. Will need ICU eval

## 2023-04-17 NOTE — CONSULT NOTE ADULT - ASSESSMENT
83F PMH HTN and asthma presents with acute onset watery diarrhea and NBNB vomiting each 10 times already since yesterday (Sunday) after returning from [place] last Friday now found to be febrile hypotensive in ED concerning for septic shock. MICU consulted for evaluation of patient's hypotension    #Hypotension i/s/o hypovolemia and sepsis  -pt. with 2 day hx vomiting and diarrhea after returning from [place]  -Labs showing bandemia 33%, patient febrile 100.9, possibly GI infection  -pt. s/p 3.5L fluids with improvement in BP  -pt. on zosyn and vancomycin    Recommendations  -c/w fluid resuscitation, patient responsive to boluses  -C/w infectious workup - blood cultures, stool studies/culture/GI PCR, UA/UCx  -c/w antibiotics      Patient is not a MICU candidate at this time. Please re-consult MICU if patient's condition changes or for any further questions.

## 2023-04-17 NOTE — H&P ADULT - PROBLEM SELECTOR PLAN 3
Currently with some wheezing and some basilar crackles without respiratory distress  -Will continue duonbes q6h standing  -if symptoms worsen, will consider systemic steroids but trying to avoid in setting of septic shock

## 2023-04-17 NOTE — H&P ADULT - HISTORY OF PRESENT ILLNESS
83F PMH HTN and asthma presents with acute onset diarrhea and vomiting each 10 times already since yesterday (Sunday). Patient arrived to [place] from [place] on Saturday. She was otherwise in her usual state of health and rest of family was doing ok. Daughter in law at bedside to assist with translation. Patient did not eat anything out of the ordinary recently and suspects it was something in the airplane. Her vomitus was nonbloody and nonbilious and has passed gas yesterday. Patient denies chest pain, dyspnea, wheezing, cough, fever, chills, leg swelling, abdominal pain or headaches.  In the ED, 74/50 -> 94/48, T 100.9. 74/50 -> 94/48, T 100.9, bandemia of 33%, lactic acidosis without much response to IVF. BP has not improved at all however patient stating she feels well currently. Given Zosyn in ED. 83F PMH HTN and asthma presents with acute onset diarrhea and vomiting each 10 times already since yesterday (Sunday). Patient arrived to [place] from [place] on Saturday. She was otherwise in her usual state of health and rest of family was doing ok. Daughter in law at bedside to assist with translation. Patient did not eat anything out of the ordinary recently and suspects it was something in the airplane. Her vomitus was nonbloody and nonbilious and has passed gas yesterday. Patient denies chest pain, dyspnea, wheezing, cough, fever, chills, leg swelling, abdominal pain or headaches.  In the ED, 74/50 -> 94/48, T 100.9. 74/50 -> 94/48, T 100.9, bandemia of 33%, lactic acidosis without much response to IVF. BP has not improved at all however patient stating she feels well currently. Given Zosyn in ED. CT A/P pending

## 2023-04-17 NOTE — H&P ADULT - PROBLEM SELECTOR PLAN 4
Currently hypotensive but daughter states that her BP is usually around 95/60. Last took anti-HTN on Saturday PM

## 2023-04-17 NOTE — PROGRESS NOTE ADULT - ATTENDING COMMENTS
Patient seen and examined, care plan discussed with house staff as above.    83yoF admitted w diarrhea in septic shock, now improved after 3.5L IVF in the ED. Found to have a 6.3cm diameter pelvic mass (abscess vs malignancy) on CT, no evidence of diverticulitis. Abx narrowed from vanc/zosyn to zosyn on 4/17, no culture data yet available, but given SHI and low concern for MRSA infection, vanc was d/c'd with close monitoring of cultures and fever curve. Gyn consulted for pelvic mass on 4/17, rec's pelvic US [ ] to further approximate. SHI improved w fluids, CTM, repeat BMP w lactate in the afternoon. Daughter in law, son and patient updated at the bedside.

## 2023-04-17 NOTE — H&P ADULT - ASSESSMENT
83F PMH HTN and asthma presents with acute onset diarrhea and vomiting each 10 times already since yesterday (Sunday). Patient now admitted for septic shock

## 2023-04-18 LAB
ALBUMIN SERPL ELPH-MCNC: 4.3 G/DL — SIGNIFICANT CHANGE UP (ref 3.3–5)
ALP SERPL-CCNC: 73 U/L — SIGNIFICANT CHANGE UP (ref 40–120)
ALT FLD-CCNC: 17 U/L — SIGNIFICANT CHANGE UP (ref 4–33)
ANION GAP SERPL CALC-SCNC: 15 MMOL/L — HIGH (ref 7–14)
AST SERPL-CCNC: 25 U/L — SIGNIFICANT CHANGE UP (ref 4–32)
BILIRUB SERPL-MCNC: 0.5 MG/DL — SIGNIFICANT CHANGE UP (ref 0.2–1.2)
BUN SERPL-MCNC: 45 MG/DL — HIGH (ref 7–23)
CALCIUM SERPL-MCNC: 9.8 MG/DL — SIGNIFICANT CHANGE UP (ref 8.4–10.5)
CHLORIDE SERPL-SCNC: 96 MMOL/L — LOW (ref 98–107)
CO2 SERPL-SCNC: 17 MMOL/L — LOW (ref 22–31)
CREAT SERPL-MCNC: 1.85 MG/DL — HIGH (ref 0.5–1.3)
EGFR: 27 ML/MIN/1.73M2 — LOW
GLUCOSE SERPL-MCNC: 110 MG/DL — HIGH (ref 70–99)
GRAM STN FLD: SIGNIFICANT CHANGE UP
GRAM STN FLD: SIGNIFICANT CHANGE UP
HCT VFR BLD CALC: 40.4 % — SIGNIFICANT CHANGE UP (ref 34.5–45)
HGB BLD-MCNC: 13.8 G/DL — SIGNIFICANT CHANGE UP (ref 11.5–15.5)
MAGNESIUM SERPL-MCNC: 2.2 MG/DL — SIGNIFICANT CHANGE UP (ref 1.6–2.6)
MCHC RBC-ENTMCNC: 29.4 PG — SIGNIFICANT CHANGE UP (ref 27–34)
MCHC RBC-ENTMCNC: 34.2 GM/DL — SIGNIFICANT CHANGE UP (ref 32–36)
MCV RBC AUTO: 86.1 FL — SIGNIFICANT CHANGE UP (ref 80–100)
MRSA PCR RESULT.: SIGNIFICANT CHANGE UP
NRBC # BLD: 0 /100 WBCS — SIGNIFICANT CHANGE UP (ref 0–0)
NRBC # FLD: 0 K/UL — SIGNIFICANT CHANGE UP (ref 0–0)
OSMOLALITY SERPL: 283 MOSM/KG — SIGNIFICANT CHANGE UP (ref 275–295)
PHOSPHATE SERPL-MCNC: 3.8 MG/DL — SIGNIFICANT CHANGE UP (ref 2.5–4.5)
PLATELET # BLD AUTO: 125 K/UL — LOW (ref 150–400)
POTASSIUM SERPL-MCNC: 3.3 MMOL/L — LOW (ref 3.5–5.3)
POTASSIUM SERPL-SCNC: 3.3 MMOL/L — LOW (ref 3.5–5.3)
PROT SERPL-MCNC: 7.9 G/DL — SIGNIFICANT CHANGE UP (ref 6–8.3)
RBC # BLD: 4.69 M/UL — SIGNIFICANT CHANGE UP (ref 3.8–5.2)
RBC # FLD: 14.3 % — SIGNIFICANT CHANGE UP (ref 10.3–14.5)
S AUREUS DNA NOSE QL NAA+PROBE: DETECTED
SODIUM SERPL-SCNC: 128 MMOL/L — LOW (ref 135–145)
WBC # BLD: 7.71 K/UL — SIGNIFICANT CHANGE UP (ref 3.8–10.5)
WBC # FLD AUTO: 7.71 K/UL — SIGNIFICANT CHANGE UP (ref 3.8–10.5)

## 2023-04-18 PROCEDURE — 76856 US EXAM PELVIC COMPLETE: CPT | Mod: 26

## 2023-04-18 PROCEDURE — 99233 SBSQ HOSP IP/OBS HIGH 50: CPT

## 2023-04-18 RX ORDER — METRONIDAZOLE 500 MG
500 TABLET ORAL EVERY 8 HOURS
Refills: 0 | Status: DISCONTINUED | OUTPATIENT
Start: 2023-04-18 | End: 2023-04-22

## 2023-04-18 RX ORDER — POTASSIUM CHLORIDE 20 MEQ
40 PACKET (EA) ORAL ONCE
Refills: 0 | Status: COMPLETED | OUTPATIENT
Start: 2023-04-18 | End: 2023-04-18

## 2023-04-18 RX ORDER — CEFTRIAXONE 500 MG/1
1000 INJECTION, POWDER, FOR SOLUTION INTRAMUSCULAR; INTRAVENOUS EVERY 24 HOURS
Refills: 0 | Status: DISCONTINUED | OUTPATIENT
Start: 2023-04-18 | End: 2023-04-22

## 2023-04-18 RX ADMIN — Medication 3 MILLILITER(S): at 15:55

## 2023-04-18 RX ADMIN — Medication 500 MILLIGRAM(S): at 14:46

## 2023-04-18 RX ADMIN — ONDANSETRON 4 MILLIGRAM(S): 8 TABLET, FILM COATED ORAL at 11:45

## 2023-04-18 RX ADMIN — CEFTRIAXONE 100 MILLIGRAM(S): 500 INJECTION, POWDER, FOR SOLUTION INTRAMUSCULAR; INTRAVENOUS at 17:42

## 2023-04-18 RX ADMIN — Medication 3 MILLILITER(S): at 22:59

## 2023-04-18 RX ADMIN — Medication 500 MILLIGRAM(S): at 22:15

## 2023-04-18 RX ADMIN — Medication 650 MILLIGRAM(S): at 18:40

## 2023-04-18 RX ADMIN — MONTELUKAST 10 MILLIGRAM(S): 4 TABLET, CHEWABLE ORAL at 11:39

## 2023-04-18 RX ADMIN — Medication 650 MILLIGRAM(S): at 17:48

## 2023-04-18 RX ADMIN — Medication 100 MILLIEQUIVALENT(S): at 00:28

## 2023-04-18 RX ADMIN — DORZOLAMIDE HYDROCHLORIDE TIMOLOL MALEATE 1 DROP(S): 20; 5 SOLUTION/ DROPS OPHTHALMIC at 17:43

## 2023-04-18 RX ADMIN — Medication 3 MILLILITER(S): at 11:16

## 2023-04-18 RX ADMIN — HEPARIN SODIUM 5000 UNIT(S): 5000 INJECTION INTRAVENOUS; SUBCUTANEOUS at 22:15

## 2023-04-18 RX ADMIN — HEPARIN SODIUM 5000 UNIT(S): 5000 INJECTION INTRAVENOUS; SUBCUTANEOUS at 13:20

## 2023-04-18 RX ADMIN — Medication 100 MILLIEQUIVALENT(S): at 01:31

## 2023-04-18 NOTE — CHART NOTE - NSCHARTNOTEFT_GEN_A_CORE
R3 GYN Event Note    Patient seen at bedside with entire GYN team. Patient and two family members were present. They declined  services, two family members elected to translate on patient's behalf.     Reviewed imaging findings and reason for primary team consulting the Gynecology team. Family members report previously discussing the imaging findings and ovarian cyst with the patient prior to this time. At this time, they are declining a consult, as patient is only visiting from [place]. They would like yo focus on the reason for their current admission. The patient plans to follow up and have a full consult once she return to [place], and her family members there are aware and will do so.     Patient and family members counseled that this is not an emergent consult but recommended that it should be followed up. Next steps would include labs and further imaging as well as obtaining a past medical/family/oncological history, and a GYN physical exam. They expressed understanding and continue to decline. They are aware that there is an in house GYN team at all times and should they change their minds regarding GYN evaluation, they may do so at any time during their stay.     GYN team to sign off.   Reconsult as clinically indicated.     Rossi PGY-3

## 2023-04-18 NOTE — PROGRESS NOTE ADULT - ATTENDING COMMENTS
Patient seen and examined, care plan discussed with house staff as above.    83yoF admitted w diarrhea in septic shock 2/2 salmonella bacteremia, now improved after 3.5L IVF in the ED. Found to have a 6.3cm diameter pelvic mass (abscess vs malignancy) on CT, no evidence of diverticulitis. Abx narrowed from vanc/zosyn to zosyn on 4/17-4/18, transitioned to CTX/flagyl (4/18- ), f/u salmonella sensitivities [ ].    Gyn consulted for pelvic mass on 4/17, rec's pelvic US [ ] to further approximate. Will need abd/pelvic MRI in outpatient setting, family aware, wants to get this done in clinic, declining further inpatient GYN/Onc work up due to insurance coverage concerns (pt lives in [place] primarily).     Daughter in law, son and patient updated at the bedside.    Prep for d/c once diarrhea improves, advance diet on 4/18. Prep dc for 4/19.

## 2023-04-18 NOTE — PROGRESS NOTE ADULT - SUBJECTIVE AND OBJECTIVE BOX
Robi Garcia  PGY-1 Resident Physician     Patient is a 83y old  Female who presents with a chief complaint of Diarrhea, vomiting (2023 14:59)      SUBJECTIVE / OVERNIGHT EVENTS:    MEDICATIONS  (STANDING):  albuterol/ipratropium for Nebulization 3 milliLiter(s) Nebulizer every 6 hours  dextrose 5% + lactated ringers. 1000 milliLiter(s) (75 mL/Hr) IV Continuous <Continuous>  dorzolamide 2%/timolol 0.5% Ophthalmic Solution 1 Drop(s) Both EYES two times a day  heparin   Injectable 5000 Unit(s) SubCutaneous every 8 hours  montelukast 10 milliGRAM(s) Oral daily  piperacillin/tazobactam IVPB.. 3.375 Gram(s) IV Intermittent every 12 hours    MEDICATIONS  (PRN):  acetaminophen     Tablet .. 650 milliGRAM(s) Oral every 6 hours PRN Temp greater or equal to 38C (100.4F), Mild Pain (1 - 3)  aluminum hydroxide/magnesium hydroxide/simethicone Suspension 30 milliLiter(s) Oral every 4 hours PRN Dyspepsia  melatonin 3 milliGRAM(s) Oral at bedtime PRN Insomnia  ondansetron Injectable 4 milliGRAM(s) IV Push every 8 hours PRN Nausea and/or Vomiting    Allergies    No Known Allergies    Intolerances        Vital Signs Last 24 Hrs  T(C): 36.5 (2023 09:15), Max: 37.1 (2023 05:24)  T(F): 97.7 (2023 09:15), Max: 98.7 (2023 05:24)  HR: 60 (2023 11:16) (60 - 99)  BP: 124/70 (2023 09:15) (94/50 - 124/70)  BP(mean): --  RR: 18 (2023 09:15) (18 - 18)  SpO2: 97% (2023 09:15) (95% - 99%)    Parameters below as of 2023 09:15  Patient On (Oxygen Delivery Method): room air      Daily     Daily   I&O's Summary      Physical Exam  GENERAL: NAD. Well-developed.  NEUROLOGICAL: A&O x 4. CN2-12. Strength, Sensation, ROM wnl. Brachial, ankle, babinski reflex wnl. Cerebellar signs (FTN) wnl. Gait appreciated.    HEAD: Atraumatic, normocephalic,   EYES: EOMI, PERRLA, No conjunctival or scleral injection.  NASAL/ORAL: Oral mucosa with moist membranes. Normal dentition. No pharyngeal injection or exudates.                    NECK: No lymphadenopathy. Supple, symmetric and without tracheal deviation.   CARDIAC: RRR w/ normal S1 & S2. No murmurs, rubs. & gallops. Radial & dorsal pedis pulses intact. No carotid bruits.   PULMONARY: CTA b/l w/o accessory muscle use.   GI: Soft, NT, ND, no rebound, no guarding. NABS in 4 quads. No palpable masses. No hepatosplenomegaly. No hernia visualized. No excessive scarring. Negative tejeda , psoas, obturator signs.   RENAL: No lower extremity edema. No CVA tenderness.   MSK/DERM:  Examination of the (head/neck/spine/ribs/pelvis, RUE, LUE, RLE, LLE) without misalignment.  Normal ROM without pain, no spinal tenderness, normal muscle strength/tone. No rashes or ulcers noted; no subcutaneous nodules or induration palpable  PSYCH: Appropriate insight/judgment    DIAGNOSTICS:                         13.8   7.71  )-----------( 125      ( 2023 06:38 )             40.4     Hgb Trend: 13.8<--, 11.8<--, 13.8<--  04-18    128<L>  |  96<L>  |  45<H>  ----------------------------<  110<H>  3.3<L>   |  17<L>  |  1.85<H>    Ca    9.8      2023 06:38  Phos  3.8     04-18  Mg     2.20     04-18    TPro  7.9  /  Alb  4.3  /  TBili  0.5  /  DBili  x   /  AST  25  /  ALT  17  /  AlkPhos  73  04-18    CAPILLARY BLOOD GLUCOSE        Creatinine Trend: 1.85<--, 2.07<--, 2.57<--, 2.86<--, 2.94<--  LIVER FUNCTIONS - ( 2023 06:38 )  Alb: 4.3 g/dL / Pro: 7.9 g/dL / ALK PHOS: 73 U/L / ALT: 17 U/L / AST: 25 U/L / GGT: x           PT/INR - ( 2023 05:53 )   PT: 12.7 sec;   INR: 1.09 ratio         PTT - ( 2023 05:53 )  PTT:23.9 sec      Urinalysis Basic - ( 2023 17:00 )    Color: Yellow / Appearance: Slightly Turbid / S.020 / pH: x  Gluc: x / Ketone: Negative  / Bili: Negative / Urobili: <2 mg/dL   Blood: x / Protein: 30 mg/dL / Nitrite: Negative   Leuk Esterase: Negative / RBC: 3 /HPF / WBC 6 /HPF   Sq Epi: x / Non Sq Epi: x / Bacteria: Negative           Robi Garcia  PGY-1 Resident Physician     Patient is a 83y old  Female who presents with a chief complaint of Diarrhea, vomiting (2023 14:59)      SUBJECTIVE / OVERNIGHT EVENTS:  NAEON. Able to tolerate diet. Remaining ROS (-).     MEDICATIONS  (STANDING):  albuterol/ipratropium for Nebulization 3 milliLiter(s) Nebulizer every 6 hours  dextrose 5% + lactated ringers. 1000 milliLiter(s) (75 mL/Hr) IV Continuous <Continuous>  dorzolamide 2%/timolol 0.5% Ophthalmic Solution 1 Drop(s) Both EYES two times a day  heparin   Injectable 5000 Unit(s) SubCutaneous every 8 hours  montelukast 10 milliGRAM(s) Oral daily  piperacillin/tazobactam IVPB.. 3.375 Gram(s) IV Intermittent every 12 hours    MEDICATIONS  (PRN):  acetaminophen     Tablet .. 650 milliGRAM(s) Oral every 6 hours PRN Temp greater or equal to 38C (100.4F), Mild Pain (1 - 3)  aluminum hydroxide/magnesium hydroxide/simethicone Suspension 30 milliLiter(s) Oral every 4 hours PRN Dyspepsia  melatonin 3 milliGRAM(s) Oral at bedtime PRN Insomnia  ondansetron Injectable 4 milliGRAM(s) IV Push every 8 hours PRN Nausea and/or Vomiting    Allergies    No Known Allergies    Intolerances        Vital Signs Last 24 Hrs  T(C): 36.5 (2023 09:15), Max: 37.1 (2023 05:24)  T(F): 97.7 (2023 09:15), Max: 98.7 (2023 05:24)  HR: 60 (2023 11:16) (60 - 99)  BP: 124/70 (2023 09:15) (94/50 - 124/70)  BP(mean): --  RR: 18 (2023 09:15) (18 - 18)  SpO2: 97% (2023 09:15) (95% - 99%)    Parameters below as of 2023 09:15  Patient On (Oxygen Delivery Method): room air      Daily     Daily   I&O's Summary      Physical Exam  GENERAL: NAD. Well-developed.  NEUROLOGICAL: A&O x 4. CN2-12. Strength, Sensation, ROM wnl. Brachial, ankle, babinski reflex wnl. Cerebellar signs (FTN) wnl. Gait appreciated.                  NECK: No lymphadenopathy. Supple, symmetric and without tracheal deviation.   CARDIAC: RRR w/ normal S1 & S2. No murmurs, rubs. & gallops. Radial & dorsal pedis pulses intact. No carotid bruits.   PULMONARY: CTA b/l w/o accessory muscle use.   GI: Soft, NT, ND, no rebound, no guarding. NABS in 4 quads.   RENAL: No lower extremity edema. No CVA tenderness.       DIAGNOSTICS:                         13.8   7.71  )-----------( 125      ( 2023 06:38 )             40.4     Hgb Trend: 13.8<--, 11.8<--, 13.8<--  04-18    128<L>  |  96<L>  |  45<H>  ----------------------------<  110<H>  3.3<L>   |  17<L>  |  1.85<H>    Ca    9.8      2023 06:38  Phos  3.8     04-18  Mg     2.20     04-18    TPro  7.9  /  Alb  4.3  /  TBili  0.5  /  DBili  x   /  AST  25  /  ALT  17  /  AlkPhos  73  04-18    CAPILLARY BLOOD GLUCOSE        Creatinine Trend: 1.85<--, 2.07<--, 2.57<--, 2.86<--, 2.94<--  LIVER FUNCTIONS - ( 2023 06:38 )  Alb: 4.3 g/dL / Pro: 7.9 g/dL / ALK PHOS: 73 U/L / ALT: 17 U/L / AST: 25 U/L / GGT: x           PT/INR - ( 2023 05:53 )   PT: 12.7 sec;   INR: 1.09 ratio         PTT - ( 2023 05:53 )  PTT:23.9 sec      Urinalysis Basic - ( 2023 17:00 )    Color: Yellow / Appearance: Slightly Turbid / S.020 / pH: x  Gluc: x / Ketone: Negative  / Bili: Negative / Urobili: <2 mg/dL   Blood: x / Protein: 30 mg/dL / Nitrite: Negative   Leuk Esterase: Negative / RBC: 3 /HPF / WBC 6 /HPF   Sq Epi: x / Non Sq Epi: x / Bacteria: Negative

## 2023-04-18 NOTE — PROGRESS NOTE ADULT - PROBLEM SELECTOR PLAN 1
-Dx: Septic shock 2/2 salmonella  -presented w/ 10x episodes of vomiting after recent flight from [place]  -s/p 4 x 1L bolus, MAP 50-60 despite aggressive IVF given earlier in ED  -Noted lactic acidosis   -s/p vanc/zosyn in ED  -MICU consulted for hypotension; at this time not MICU candidate  -CT A/P w/o colitis, noted pelvic fluid mass (potential infectious vs neoplastic source)  -Pelvic US confirming pelvic fluid mass  -MRSA swab (-), (+) MSSA colonization   -BCx (+) for salmonella  > Transitioned from Zosyn (4/17-4/18) to Augmentin, Ceftriazone (4/18-)     > awaiting oral sensitivites  > f/u UCx, GI stool PCR, BCx, UA  > family opting to investigate pelvic mass [place] as [place]

## 2023-04-19 LAB
-  AMPICILLIN: SIGNIFICANT CHANGE UP
-  CEFTRIAXONE: SIGNIFICANT CHANGE UP
-  TRIMETHOPRIM/SULFAMETHOXAZOLE: SIGNIFICANT CHANGE UP
ALBUMIN SERPL ELPH-MCNC: 4.7 G/DL — SIGNIFICANT CHANGE UP (ref 3.3–5)
ALP SERPL-CCNC: 91 U/L — SIGNIFICANT CHANGE UP (ref 40–120)
ALT FLD-CCNC: 20 U/L — SIGNIFICANT CHANGE UP (ref 4–33)
ANION GAP SERPL CALC-SCNC: 20 MMOL/L — HIGH (ref 7–14)
AST SERPL-CCNC: 27 U/L — SIGNIFICANT CHANGE UP (ref 4–32)
BASOPHILS # BLD AUTO: 0.06 K/UL — SIGNIFICANT CHANGE UP (ref 0–0.2)
BASOPHILS NFR BLD AUTO: 0.6 % — SIGNIFICANT CHANGE UP (ref 0–2)
BILIRUB SERPL-MCNC: 0.4 MG/DL — SIGNIFICANT CHANGE UP (ref 0.2–1.2)
BUN SERPL-MCNC: 60 MG/DL — HIGH (ref 7–23)
CALCIUM SERPL-MCNC: 9.9 MG/DL — SIGNIFICANT CHANGE UP (ref 8.4–10.5)
CHLORIDE SERPL-SCNC: 91 MMOL/L — LOW (ref 98–107)
CO2 SERPL-SCNC: 17 MMOL/L — LOW (ref 22–31)
CREAT SERPL-MCNC: 2.64 MG/DL — HIGH (ref 0.5–1.3)
CULTURE RESULTS: SIGNIFICANT CHANGE UP
EGFR: 18 ML/MIN/1.73M2 — LOW
EOSINOPHIL # BLD AUTO: 0.03 K/UL — SIGNIFICANT CHANGE UP (ref 0–0.5)
EOSINOPHIL NFR BLD AUTO: 0.3 % — SIGNIFICANT CHANGE UP (ref 0–6)
GLUCOSE SERPL-MCNC: 118 MG/DL — HIGH (ref 70–99)
HCT VFR BLD CALC: 44.9 % — SIGNIFICANT CHANGE UP (ref 34.5–45)
HGB BLD-MCNC: 15.3 G/DL — SIGNIFICANT CHANGE UP (ref 11.5–15.5)
IANC: 7.41 K/UL — HIGH (ref 1.8–7.4)
IMM GRANULOCYTES NFR BLD AUTO: 1.5 % — HIGH (ref 0–0.9)
LYMPHOCYTES # BLD AUTO: 1.35 K/UL — SIGNIFICANT CHANGE UP (ref 1–3.3)
LYMPHOCYTES # BLD AUTO: 13 % — SIGNIFICANT CHANGE UP (ref 13–44)
MAGNESIUM SERPL-MCNC: 2.5 MG/DL — SIGNIFICANT CHANGE UP (ref 1.6–2.6)
MCHC RBC-ENTMCNC: 28.8 PG — SIGNIFICANT CHANGE UP (ref 27–34)
MCHC RBC-ENTMCNC: 34.1 GM/DL — SIGNIFICANT CHANGE UP (ref 32–36)
MCV RBC AUTO: 84.6 FL — SIGNIFICANT CHANGE UP (ref 80–100)
METHOD TYPE: SIGNIFICANT CHANGE UP
MONOCYTES # BLD AUTO: 1.34 K/UL — HIGH (ref 0–0.9)
MONOCYTES NFR BLD AUTO: 12.9 % — SIGNIFICANT CHANGE UP (ref 2–14)
NEUTROPHILS # BLD AUTO: 7.41 K/UL — HIGH (ref 1.8–7.4)
NEUTROPHILS NFR BLD AUTO: 71.7 % — SIGNIFICANT CHANGE UP (ref 43–77)
NRBC # BLD: 0 /100 WBCS — SIGNIFICANT CHANGE UP (ref 0–0)
NRBC # FLD: 0 K/UL — SIGNIFICANT CHANGE UP (ref 0–0)
PHOSPHATE SERPL-MCNC: 4.6 MG/DL — HIGH (ref 2.5–4.5)
PLATELET # BLD AUTO: 152 K/UL — SIGNIFICANT CHANGE UP (ref 150–400)
POTASSIUM SERPL-MCNC: 3.3 MMOL/L — LOW (ref 3.5–5.3)
POTASSIUM SERPL-SCNC: 3.3 MMOL/L — LOW (ref 3.5–5.3)
PROT SERPL-MCNC: 8.6 G/DL — HIGH (ref 6–8.3)
RBC # BLD: 5.31 M/UL — HIGH (ref 3.8–5.2)
RBC # FLD: 13.8 % — SIGNIFICANT CHANGE UP (ref 10.3–14.5)
SODIUM SERPL-SCNC: 128 MMOL/L — LOW (ref 135–145)
SPECIMEN SOURCE: SIGNIFICANT CHANGE UP
WBC # BLD: 10.29 K/UL — SIGNIFICANT CHANGE UP (ref 3.8–10.5)
WBC # FLD AUTO: 10.29 K/UL — SIGNIFICANT CHANGE UP (ref 3.8–10.5)

## 2023-04-19 PROCEDURE — 99233 SBSQ HOSP IP/OBS HIGH 50: CPT

## 2023-04-19 RX ORDER — SODIUM CHLORIDE 9 MG/ML
1000 INJECTION, SOLUTION INTRAVENOUS
Refills: 0 | Status: DISCONTINUED | OUTPATIENT
Start: 2023-04-19 | End: 2023-04-19

## 2023-04-19 RX ORDER — SODIUM CHLORIDE 9 MG/ML
1000 INJECTION, SOLUTION INTRAVENOUS
Refills: 0 | Status: DISCONTINUED | OUTPATIENT
Start: 2023-04-19 | End: 2023-04-21

## 2023-04-19 RX ORDER — POTASSIUM CHLORIDE 20 MEQ
40 PACKET (EA) ORAL ONCE
Refills: 0 | Status: COMPLETED | OUTPATIENT
Start: 2023-04-19 | End: 2023-04-19

## 2023-04-19 RX ORDER — NYSTATIN CREAM 100000 [USP'U]/G
1 CREAM TOPICAL
Refills: 0 | Status: DISCONTINUED | OUTPATIENT
Start: 2023-04-19 | End: 2023-04-23

## 2023-04-19 RX ORDER — MUPIROCIN 20 MG/G
1 OINTMENT TOPICAL
Refills: 0 | Status: DISCONTINUED | OUTPATIENT
Start: 2023-04-19 | End: 2023-04-23

## 2023-04-19 RX ADMIN — HEPARIN SODIUM 5000 UNIT(S): 5000 INJECTION INTRAVENOUS; SUBCUTANEOUS at 14:26

## 2023-04-19 RX ADMIN — Medication 500 MILLIGRAM(S): at 06:07

## 2023-04-19 RX ADMIN — HEPARIN SODIUM 5000 UNIT(S): 5000 INJECTION INTRAVENOUS; SUBCUTANEOUS at 06:06

## 2023-04-19 RX ADMIN — Medication 3 MILLILITER(S): at 23:22

## 2023-04-19 RX ADMIN — Medication 3 MILLILITER(S): at 04:26

## 2023-04-19 RX ADMIN — Medication 500 MILLIGRAM(S): at 22:22

## 2023-04-19 RX ADMIN — Medication 40 MILLIEQUIVALENT(S): at 09:40

## 2023-04-19 RX ADMIN — NYSTATIN CREAM 1 APPLICATION(S): 100000 CREAM TOPICAL at 18:10

## 2023-04-19 RX ADMIN — DORZOLAMIDE HYDROCHLORIDE TIMOLOL MALEATE 1 DROP(S): 20; 5 SOLUTION/ DROPS OPHTHALMIC at 06:09

## 2023-04-19 RX ADMIN — SODIUM CHLORIDE 100 MILLILITER(S): 9 INJECTION, SOLUTION INTRAVENOUS at 12:49

## 2023-04-19 RX ADMIN — SODIUM CHLORIDE 75 MILLILITER(S): 9 INJECTION, SOLUTION INTRAVENOUS at 13:52

## 2023-04-19 RX ADMIN — ONDANSETRON 4 MILLIGRAM(S): 8 TABLET, FILM COATED ORAL at 13:52

## 2023-04-19 RX ADMIN — CEFTRIAXONE 100 MILLIGRAM(S): 500 INJECTION, POWDER, FOR SOLUTION INTRAMUSCULAR; INTRAVENOUS at 18:11

## 2023-04-19 RX ADMIN — DORZOLAMIDE HYDROCHLORIDE TIMOLOL MALEATE 1 DROP(S): 20; 5 SOLUTION/ DROPS OPHTHALMIC at 18:08

## 2023-04-19 RX ADMIN — MONTELUKAST 10 MILLIGRAM(S): 4 TABLET, CHEWABLE ORAL at 12:06

## 2023-04-19 RX ADMIN — MUPIROCIN 1 APPLICATION(S): 20 OINTMENT TOPICAL at 18:09

## 2023-04-19 RX ADMIN — Medication 500 MILLIGRAM(S): at 14:26

## 2023-04-19 RX ADMIN — Medication 3 MILLILITER(S): at 11:53

## 2023-04-19 RX ADMIN — Medication 3 MILLILITER(S): at 17:00

## 2023-04-19 RX ADMIN — HEPARIN SODIUM 5000 UNIT(S): 5000 INJECTION INTRAVENOUS; SUBCUTANEOUS at 22:21

## 2023-04-19 NOTE — PROGRESS NOTE ADULT - SUBJECTIVE AND OBJECTIVE BOX
Robi Garcia  PGY-1 Resident Physician     Patient is a 83y old  Female who presents with a chief complaint of Diarrhea, vomiting (2023 09:03)      SUBJECTIVE / OVERNIGHT EVENTS:  Continued to have diarrhea o/n (4 episodes, green). Denies remaining ROS.     MEDICATIONS  (STANDING):  albuterol/ipratropium for Nebulization 3 milliLiter(s) Nebulizer every 6 hours  cefTRIAXone   IVPB 1000 milliGRAM(s) IV Intermittent every 24 hours  dorzolamide 2%/timolol 0.5% Ophthalmic Solution 1 Drop(s) Both EYES two times a day  heparin   Injectable 5000 Unit(s) SubCutaneous every 8 hours  lactated ringers. 1000 milliLiter(s) (75 mL/Hr) IV Continuous <Continuous>  metroNIDAZOLE    Tablet 500 milliGRAM(s) Oral every 8 hours  montelukast 10 milliGRAM(s) Oral daily  nystatin Powder 1 Application(s) Topical two times a day    MEDICATIONS  (PRN):  acetaminophen     Tablet .. 650 milliGRAM(s) Oral every 6 hours PRN Temp greater or equal to 38C (100.4F), Mild Pain (1 - 3)  aluminum hydroxide/magnesium hydroxide/simethicone Suspension 30 milliLiter(s) Oral every 4 hours PRN Dyspepsia  melatonin 3 milliGRAM(s) Oral at bedtime PRN Insomnia  ondansetron Injectable 4 milliGRAM(s) IV Push every 8 hours PRN Nausea and/or Vomiting    Allergies    No Known Allergies    Intolerances        Vital Signs Last 24 Hrs  T(C): 36.4 (2023 13:28), Max: 36.7 (2023 17:15)  T(F): 97.6 (2023 13:28), Max: 98.1 (2023 01:15)  HR: 65 (2023 13:28) (63 - 75)  BP: 106/64 (2023 13:28) (100/56 - 106/64)  BP(mean): --  RR: 18 (2023 13:28) (17 - 18)  SpO2: 96% (2023 13:28) (95% - 96%)    Parameters below as of 2023 13:28  Patient On (Oxygen Delivery Method): room air      Daily     Daily   I&O's Summary      Physical Exam  GENERAL: NAD. Well-developed.  NEUROLOGICAL: A&O x 4. CN2-12. Strength, Sensation, ROM wnl. Brachial, ankle, babinski reflex wnl. Cerebellar signs (FTN) wnl. Gait appreciated.                  NECK: No lymphadenopathy. Supple, symmetric and without tracheal deviation.   CARDIAC: RRR w/ normal S1 & S2. No murmurs, rubs. & gallops. Radial & dorsal pedis pulses intact. No carotid bruits.   PULMONARY: CTA b/l w/o accessory muscle use.   GI: Soft, NT, ND, no rebound, no guarding. NABS in 4 quads.   RENAL: No lower extremity edema. No CVA tenderness.     DIAGNOSTICS:                         15.3   10.29 )-----------( 152      ( 2023 06:20 )             44.9     Hgb Trend: 15.3<--, 13.8<--, 11.8<--, 13.8<--  04-19    128<L>  |  91<L>  |  60<H>  ----------------------------<  118<H>  3.3<L>   |  17<L>  |  2.64<H>    Ca    9.9      2023 06:20  Phos  4.6     04-19  Mg     2.50     04-19    TPro  8.6<H>  /  Alb  4.7  /  TBili  0.4  /  DBili  x   /  AST  27  /  ALT  20  /  AlkPhos  91  04-19    CAPILLARY BLOOD GLUCOSE        Creatinine Trend: 2.64<--, 1.85<--, 2.07<--, 2.57<--, 2.86<--, 2.94<--  LIVER FUNCTIONS - ( 2023 06:20 )  Alb: 4.7 g/dL / Pro: 8.6 g/dL / ALK PHOS: 91 U/L / ALT: 20 U/L / AST: 27 U/L / GGT: x                 Urinalysis Basic - ( 2023 17:00 )    Color: Yellow / Appearance: Slightly Turbid / S.020 / pH: x  Gluc: x / Ketone: Negative  / Bili: Negative / Urobili: <2 mg/dL   Blood: x / Protein: 30 mg/dL / Nitrite: Negative   Leuk Esterase: Negative / RBC: 3 /HPF / WBC 6 /HPF   Sq Epi: x / Non Sq Epi: x / Bacteria: Negative

## 2023-04-19 NOTE — PROGRESS NOTE ADULT - PROBLEM SELECTOR PLAN 1
-Dx: Septic shock 2/2 salmonella  -presented w/ 10x episodes of vomiting after recent flight from [place]  -s/p 4 x 1L bolus, MAP 50-60 despite aggressive IVF given earlier in ED  -Noted lactic acidosis   -s/p vanc/zosyn in ED  -MICU consulted for hypotension; at this time not MICU candidate  -CT A/P w/o colitis, noted pelvic fluid mass (potential infectious vs neoplastic source)  -Pelvic US confirming pelvic fluid mass  -MRSA swab (-), (+) MSSA colonization   -BCx (+) for salmonella  > Transitioned from Zosyn (4/17-4/18) Flagyl/Ceftriaxone (4/18-)  > f/u r/p BCx for bacterial clearance  > family opting to investigate pelvic [place] as [place]

## 2023-04-19 NOTE — PROGRESS NOTE ADULT - ATTENDING COMMENTS
Patient seen and examined, care plan discussed with house staff as above.    83yoF admitted w diarrhea in septic shock 2/2 salmonella bacteremia, now improved after 3.5L IVF in the ED. Found to have a 6.3cm diameter pelvic mass (abscess vs malignancy) on CT, no evidence of diverticulitis. Abx narrowed from vanc/zosyn to zosyn on 4/17-4/18, transitioned to CTX/flagyl (4/18- ), f/u salmonella sensitivities [non typhi], repeat blood cultures 4/19 [ ].    Gyn consulted for pelvic mass on 4/17, rec's pelvic US [ ] to further approximate. Will need abd/pelvic MRI in outpatient setting, family aware, wants to get this done in clinic, declining further inpatient GYN/Onc work up due to insurance coverage concerns (pt lives in [place] primarily).     Daughter in law, son and patient updated at the bedside.    Diarrhea ongoing, episode of nb emesis on 4/19 am after oral K packet given. SHI worsening, suspected prerenal from GI losses, continue IVF.

## 2023-04-20 LAB
ALBUMIN SERPL ELPH-MCNC: 4.4 G/DL — SIGNIFICANT CHANGE UP (ref 3.3–5)
ALP SERPL-CCNC: 93 U/L — SIGNIFICANT CHANGE UP (ref 40–120)
ALT FLD-CCNC: 18 U/L — SIGNIFICANT CHANGE UP (ref 4–33)
ANION GAP SERPL CALC-SCNC: 19 MMOL/L — HIGH (ref 7–14)
AST SERPL-CCNC: 19 U/L — SIGNIFICANT CHANGE UP (ref 4–32)
BASOPHILS # BLD AUTO: 0 K/UL — SIGNIFICANT CHANGE UP (ref 0–0.2)
BASOPHILS NFR BLD AUTO: 0 % — SIGNIFICANT CHANGE UP (ref 0–2)
BILIRUB SERPL-MCNC: 0.4 MG/DL — SIGNIFICANT CHANGE UP (ref 0.2–1.2)
BUN SERPL-MCNC: 74 MG/DL — HIGH (ref 7–23)
CALCIUM SERPL-MCNC: 9.4 MG/DL — SIGNIFICANT CHANGE UP (ref 8.4–10.5)
CHLORIDE SERPL-SCNC: 89 MMOL/L — LOW (ref 98–107)
CO2 SERPL-SCNC: 17 MMOL/L — LOW (ref 22–31)
CREAT SERPL-MCNC: 3.48 MG/DL — HIGH (ref 0.5–1.3)
EGFR: 13 ML/MIN/1.73M2 — LOW
EOSINOPHIL # BLD AUTO: 0.11 K/UL — SIGNIFICANT CHANGE UP (ref 0–0.5)
EOSINOPHIL NFR BLD AUTO: 0.9 % — SIGNIFICANT CHANGE UP (ref 0–6)
GLUCOSE SERPL-MCNC: 124 MG/DL — HIGH (ref 70–99)
HCT VFR BLD CALC: 42.6 % — SIGNIFICANT CHANGE UP (ref 34.5–45)
HGB BLD-MCNC: 14.6 G/DL — SIGNIFICANT CHANGE UP (ref 11.5–15.5)
IANC: 8.62 K/UL — HIGH (ref 1.8–7.4)
LYMPHOCYTES # BLD AUTO: 0.99 K/UL — LOW (ref 1–3.3)
LYMPHOCYTES # BLD AUTO: 7.8 % — LOW (ref 13–44)
MAGNESIUM SERPL-MCNC: 2.6 MG/DL — SIGNIFICANT CHANGE UP (ref 1.6–2.6)
MCHC RBC-ENTMCNC: 28.4 PG — SIGNIFICANT CHANGE UP (ref 27–34)
MCHC RBC-ENTMCNC: 34.3 GM/DL — SIGNIFICANT CHANGE UP (ref 32–36)
MCV RBC AUTO: 82.9 FL — SIGNIFICANT CHANGE UP (ref 80–100)
MONOCYTES # BLD AUTO: 1.55 K/UL — HIGH (ref 0–0.9)
MONOCYTES NFR BLD AUTO: 12.2 % — SIGNIFICANT CHANGE UP (ref 2–14)
NEUTROPHILS # BLD AUTO: 9.69 K/UL — HIGH (ref 1.8–7.4)
NEUTROPHILS NFR BLD AUTO: 66.9 % — SIGNIFICANT CHANGE UP (ref 43–77)
PHOSPHATE SERPL-MCNC: 4.5 MG/DL — SIGNIFICANT CHANGE UP (ref 2.5–4.5)
PLATELET # BLD AUTO: 150 K/UL — SIGNIFICANT CHANGE UP (ref 150–400)
POTASSIUM SERPL-MCNC: 3.2 MMOL/L — LOW (ref 3.5–5.3)
POTASSIUM SERPL-SCNC: 3.2 MMOL/L — LOW (ref 3.5–5.3)
PROT SERPL-MCNC: 7.9 G/DL — SIGNIFICANT CHANGE UP (ref 6–8.3)
RBC # BLD: 5.14 M/UL — SIGNIFICANT CHANGE UP (ref 3.8–5.2)
RBC # FLD: 13.6 % — SIGNIFICANT CHANGE UP (ref 10.3–14.5)
SODIUM SERPL-SCNC: 125 MMOL/L — LOW (ref 135–145)
WBC # BLD: 12.67 K/UL — HIGH (ref 3.8–10.5)
WBC # FLD AUTO: 12.67 K/UL — HIGH (ref 3.8–10.5)

## 2023-04-20 PROCEDURE — 99233 SBSQ HOSP IP/OBS HIGH 50: CPT

## 2023-04-20 PROCEDURE — 93306 TTE W/DOPPLER COMPLETE: CPT | Mod: 26

## 2023-04-20 RX ORDER — POTASSIUM CHLORIDE 20 MEQ
40 PACKET (EA) ORAL ONCE
Refills: 0 | Status: COMPLETED | OUTPATIENT
Start: 2023-04-20 | End: 2023-04-20

## 2023-04-20 RX ADMIN — HEPARIN SODIUM 5000 UNIT(S): 5000 INJECTION INTRAVENOUS; SUBCUTANEOUS at 22:03

## 2023-04-20 RX ADMIN — Medication 3 MILLILITER(S): at 09:16

## 2023-04-20 RX ADMIN — HEPARIN SODIUM 5000 UNIT(S): 5000 INJECTION INTRAVENOUS; SUBCUTANEOUS at 16:10

## 2023-04-20 RX ADMIN — MUPIROCIN 1 APPLICATION(S): 20 OINTMENT TOPICAL at 18:30

## 2023-04-20 RX ADMIN — NYSTATIN CREAM 1 APPLICATION(S): 100000 CREAM TOPICAL at 18:29

## 2023-04-20 RX ADMIN — Medication 40 MILLIEQUIVALENT(S): at 09:49

## 2023-04-20 RX ADMIN — Medication 500 MILLIGRAM(S): at 16:09

## 2023-04-20 RX ADMIN — SODIUM CHLORIDE 75 MILLILITER(S): 9 INJECTION, SOLUTION INTRAVENOUS at 06:58

## 2023-04-20 RX ADMIN — CEFTRIAXONE 100 MILLIGRAM(S): 500 INJECTION, POWDER, FOR SOLUTION INTRAMUSCULAR; INTRAVENOUS at 18:29

## 2023-04-20 RX ADMIN — HEPARIN SODIUM 5000 UNIT(S): 5000 INJECTION INTRAVENOUS; SUBCUTANEOUS at 06:46

## 2023-04-20 RX ADMIN — MUPIROCIN 1 APPLICATION(S): 20 OINTMENT TOPICAL at 06:58

## 2023-04-20 RX ADMIN — NYSTATIN CREAM 1 APPLICATION(S): 100000 CREAM TOPICAL at 06:45

## 2023-04-20 RX ADMIN — Medication 500 MILLIGRAM(S): at 22:01

## 2023-04-20 RX ADMIN — DORZOLAMIDE HYDROCHLORIDE TIMOLOL MALEATE 1 DROP(S): 20; 5 SOLUTION/ DROPS OPHTHALMIC at 18:30

## 2023-04-20 RX ADMIN — DORZOLAMIDE HYDROCHLORIDE TIMOLOL MALEATE 1 DROP(S): 20; 5 SOLUTION/ DROPS OPHTHALMIC at 06:49

## 2023-04-20 RX ADMIN — Medication 500 MILLIGRAM(S): at 06:46

## 2023-04-20 RX ADMIN — MONTELUKAST 10 MILLIGRAM(S): 4 TABLET, CHEWABLE ORAL at 11:52

## 2023-04-20 NOTE — PROGRESS NOTE ADULT - PROBLEM SELECTOR PLAN 4
-per daughter, pt's BL BP ~  > h/o BP meds at this time -DVT Ppx: heparin  -Diet: regular  -Code Status:   -Dispo: inpt mgmt

## 2023-04-20 NOTE — PROGRESS NOTE ADULT - SUBJECTIVE AND OBJECTIVE BOX
Robi Garcia  PGY-1 Resident Physician     Patient is a 83y old  Female who presents with a chief complaint of Diarrhea, vomiting (19 Apr 2023 09:03)    SUBJECTIVE / OVERNIGHT EVENTS:  2 episodes of semi-formed stool. Pt able to tolerate half diet.     MEDICATIONS  (STANDING):  albuterol/ipratropium for Nebulization 3 milliLiter(s) Nebulizer every 6 hours  cefTRIAXone   IVPB 1000 milliGRAM(s) IV Intermittent every 24 hours  dorzolamide 2%/timolol 0.5% Ophthalmic Solution 1 Drop(s) Both EYES two times a day  heparin   Injectable 5000 Unit(s) SubCutaneous every 8 hours  lactated ringers. 1000 milliLiter(s) (75 mL/Hr) IV Continuous <Continuous>  metroNIDAZOLE    Tablet 500 milliGRAM(s) Oral every 8 hours  montelukast 10 milliGRAM(s) Oral daily  mupirocin 2% Ointment 1 Application(s) Topical two times a day  nystatin Powder 1 Application(s) Topical two times a day  potassium chloride   Powder 40 milliEquivalent(s) Oral once    MEDICATIONS  (PRN):  acetaminophen     Tablet .. 650 milliGRAM(s) Oral every 6 hours PRN Temp greater or equal to 38C (100.4F), Mild Pain (1 - 3)  aluminum hydroxide/magnesium hydroxide/simethicone Suspension 30 milliLiter(s) Oral every 4 hours PRN Dyspepsia  melatonin 3 milliGRAM(s) Oral at bedtime PRN Insomnia  ondansetron Injectable 4 milliGRAM(s) IV Push every 8 hours PRN Nausea and/or Vomiting    Allergies    No Known Allergies    Intolerances        Vital Signs Last 24 Hrs  T(C): 36.6 (20 Apr 2023 05:00), Max: 36.6 (20 Apr 2023 05:00)  T(F): 97.9 (20 Apr 2023 05:00), Max: 97.9 (20 Apr 2023 05:00)  HR: 66 (20 Apr 2023 05:00) (58 - 81)  BP: 107/64 (20 Apr 2023 05:00) (96/56 - 107/64)  BP(mean): --  RR: 18 (20 Apr 2023 05:00) (17 - 18)  SpO2: 97% (20 Apr 2023 05:00) (95% - 98%)    Parameters below as of 20 Apr 2023 05:00  Patient On (Oxygen Delivery Method): room air      Daily     Daily   I&O's Summary    19 Apr 2023 07:01  -  20 Apr 2023 07:00  --------------------------------------------------------  IN: 1400 mL / OUT: 0 mL / NET: 1400 mL        Physical Exam  GENERAL: NAD. Well-developed.  NEUROLOGICAL: A&O x 4. CN2-12. Strength, Sensation, ROM wnl. Brachial, ankle, babinski reflex wnl. Cerebellar signs (FTN) wnl. Gait appreciated.    HEAD: Atraumatic, normocephalic,   EYES: EOMI, PERRLA, No conjunctival or scleral injection.  NASAL/ORAL: Oral mucosa with moist membranes. Normal dentition. No pharyngeal injection or exudates.                    NECK: No lymphadenopathy. Supple, symmetric and without tracheal deviation.   CARDIAC: RRR w/ normal S1 & S2. No murmurs, rubs. & gallops. Radial & dorsal pedis pulses intact. No carotid bruits.   PULMONARY: CTA b/l w/o accessory muscle use.   GI: Soft, NT, ND, no rebound, no guarding. NABS in 4 quads. No palpable masses. No hepatosplenomegaly. No hernia visualized. No excessive scarring. Negative tejeda , psoas, obturator signs.   RENAL: No lower extremity edema. No CVA tenderness.   MSK/DERM:  Examination of the (head/neck/spine/ribs/pelvis, RUE, LUE, RLE, LLE) without misalignment.  Normal ROM without pain, no spinal tenderness, normal muscle strength/tone. No rashes or ulcers noted; no subcutaneous nodules or induration palpable  PSYCH: Appropriate insight/judgment    DIAGNOSTICS:                         14.6   12.67 )-----------( 150      ( 20 Apr 2023 05:54 )             42.6     Hgb Trend: 14.6<--, 15.3<--, 13.8<--, 11.8<--, 13.8<--  04-20    125<L>  |  89<L>  |  74<H>  ----------------------------<  124<H>  3.2<L>   |  17<L>  |  3.48<H>    Ca    9.4      20 Apr 2023 05:54  Phos  4.5     04-20  Mg     2.60     04-20    TPro  7.9  /  Alb  4.4  /  TBili  0.4  /  DBili  x   /  AST  19  /  ALT  18  /  AlkPhos  93  04-20    CAPILLARY BLOOD GLUCOSE        Creatinine Trend: 3.48<--, 2.64<--, 1.85<--, 2.07<--, 2.57<--, 2.86<--  LIVER FUNCTIONS - ( 20 Apr 2023 05:54 )  Alb: 4.4 g/dL / Pro: 7.9 g/dL / ALK PHOS: 93 U/L / ALT: 18 U/L / AST: 19 U/L / GGT: x                        Robi Garcia  PGY-1 Resident Physician     Patient is a 83y old  Female who presents with a chief complaint of Diarrhea, vomiting (19 Apr 2023 09:03)    SUBJECTIVE / OVERNIGHT EVENTS:  2 episodes of semi-formed stool. Pt able to tolerate half diet.     MEDICATIONS  (STANDING):  albuterol/ipratropium for Nebulization 3 milliLiter(s) Nebulizer every 6 hours  cefTRIAXone   IVPB 1000 milliGRAM(s) IV Intermittent every 24 hours  dorzolamide 2%/timolol 0.5% Ophthalmic Solution 1 Drop(s) Both EYES two times a day  heparin   Injectable 5000 Unit(s) SubCutaneous every 8 hours  lactated ringers. 1000 milliLiter(s) (75 mL/Hr) IV Continuous <Continuous>  metroNIDAZOLE    Tablet 500 milliGRAM(s) Oral every 8 hours  montelukast 10 milliGRAM(s) Oral daily  mupirocin 2% Ointment 1 Application(s) Topical two times a day  nystatin Powder 1 Application(s) Topical two times a day  potassium chloride   Powder 40 milliEquivalent(s) Oral once    MEDICATIONS  (PRN):  acetaminophen     Tablet .. 650 milliGRAM(s) Oral every 6 hours PRN Temp greater or equal to 38C (100.4F), Mild Pain (1 - 3)  aluminum hydroxide/magnesium hydroxide/simethicone Suspension 30 milliLiter(s) Oral every 4 hours PRN Dyspepsia  melatonin 3 milliGRAM(s) Oral at bedtime PRN Insomnia  ondansetron Injectable 4 milliGRAM(s) IV Push every 8 hours PRN Nausea and/or Vomiting    Allergies    No Known Allergies    Intolerances        Vital Signs Last 24 Hrs  T(C): 36.6 (20 Apr 2023 05:00), Max: 36.6 (20 Apr 2023 05:00)  T(F): 97.9 (20 Apr 2023 05:00), Max: 97.9 (20 Apr 2023 05:00)  HR: 66 (20 Apr 2023 05:00) (58 - 81)  BP: 107/64 (20 Apr 2023 05:00) (96/56 - 107/64)  BP(mean): --  RR: 18 (20 Apr 2023 05:00) (17 - 18)  SpO2: 97% (20 Apr 2023 05:00) (95% - 98%)    Parameters below as of 20 Apr 2023 05:00  Patient On (Oxygen Delivery Method): room air      Daily     Daily   I&O's Summary    19 Apr 2023 07:01  -  20 Apr 2023 07:00  --------------------------------------------------------  IN: 1400 mL / OUT: 0 mL / NET: 1400 mL        Physical Exam  GENERAL: NAD. Well-developed.  NEUROLOGICAL: A&O x 4. CN2-12. Strength, Sensation.                   NECK: No lymphadenopathy. Supple, symmetric and without tracheal deviation.   CARDIAC: RRR w/ normal S1 & S2. No murmurs, rubs. & gallops. Radial & dorsal pedis pulses intact. No carotid bruits.   PULMONARY: CTA b/l w/o accessory muscle use.   GI: Soft, NT, ND, no rebound, no guarding. NABS in 4 quads. No palpable masses.   RENAL: No lower extremity edema. No CVA tenderness.   MSK/DERM:  Examination of the (head/neck/spine/ribs/pelvis, RUE, LUE, RLE, LLE) without misalignment.   PSYCH: Appropriate insight/judgment    DIAGNOSTICS:                         14.6   12.67 )-----------( 150      ( 20 Apr 2023 05:54 )             42.6     Hgb Trend: 14.6<--, 15.3<--, 13.8<--, 11.8<--, 13.8<--  04-20    125<L>  |  89<L>  |  74<H>  ----------------------------<  124<H>  3.2<L>   |  17<L>  |  3.48<H>    Ca    9.4      20 Apr 2023 05:54  Phos  4.5     04-20  Mg     2.60     04-20    TPro  7.9  /  Alb  4.4  /  TBili  0.4  /  DBili  x   /  AST  19  /  ALT  18  /  AlkPhos  93  04-20    CAPILLARY BLOOD GLUCOSE        Creatinine Trend: 3.48<--, 2.64<--, 1.85<--, 2.07<--, 2.57<--, 2.86<--  LIVER FUNCTIONS - ( 20 Apr 2023 05:54 )  Alb: 4.4 g/dL / Pro: 7.9 g/dL / ALK PHOS: 93 U/L / ALT: 18 U/L / AST: 19 U/L / GGT: x

## 2023-04-20 NOTE — PROGRESS NOTE ADULT - PROBLEM SELECTOR PLAN 2
-Dx: Likely hypovolemic hyponatremia vs SIADH  -Serom osm wnl  > r/p serum osm, urine studies -Dx: Likely hypovolemic hyponatremia vs SIADH  > r/p serum osm, urine studies

## 2023-04-20 NOTE — PROGRESS NOTE ADULT - PROBLEM SELECTOR PLAN 5
-DVT Ppx: heparin  -Diet: regular  -Code Status:   -Dispo: inpt mgmt

## 2023-04-20 NOTE — PROGRESS NOTE ADULT - ATTENDING COMMENTS
Patient seen and examined, care plan discussed with house staff as above.    83yoF admitted w diarrhea in septic shock 2/2 salmonella bacteremia, now improved after 3.5L IVF in the ED. Found to have a 6.3cm diameter pelvic mass (abscess vs malignancy) on CT, no evidence of diverticulitis. Abx narrowed from vanc/zosyn to zosyn on 4/17-4/18, transitioned to CTX/flagyl (4/18- ), f/u salmonella sensitivities [non typhi] and check initial cultures for fluoroquinolone sensitivities [ ], repeat blood cultures 4/19 sent [NGTD ].    Gyn consulted for pelvic mass on 4/17, rec's pelvic US to further approximate, shows cystic structure in the left adnexa measuring approximately 6.7 x 4.0 x 4.4 cm. Will need abd/pelvic MRI in outpatient setting to r/o neoplasm, family aware, wants to get this done in OP clinic, declining further inpatient GYN/Onc work up due to insurance coverage concerns (pt lives in [place] primarily).     Daughter in law, son and patient updated at the bedside.     Diarrhea ongoing, episode of nb emesis on 4/19 am after oral K packet given. SHI worsening, multifactorial, prerenal/contrast induced/ nephrotoxicity from vanc/zosn on admission, all considered. F/u bladder scan w PVRs, repeat UA [ ], cont IVF, check TTE [ ] to eval for endovascular involvement w salmonella bacteremia in geriatric patient. Patient seen and examined, care plan discussed with house staff as above.    83yoF admitted w diarrhea in septic shock 2/2 salmonella bacteremia, now improved after 3.5L IVF in the ED. Found to have a 6.3cm diameter pelvic mass (abscess vs malignancy) on CT, no evidence of diverticulitis. Abx narrowed from vanc/zosyn to zosyn on 4/17-4/18, transitioned to CTX/flagyl (4/18- ), f/u salmonella sensitivities [non typhi] and check initial cultures for fluoroquinolone sensitivities [ ], repeat blood cultures 4/19 sent [NGTD ].    Gyn consulted for pelvic mass on 4/17, rec's pelvic US to further approximate, shows cystic structure in the left adnexa measuring approximately 6.7 x 4.0 x 4.4 cm. Will need abd/pelvic MRI in outpatient setting to r/o neoplasm, family aware, wants to get this done in OP clinic, declining further inpatient GYN/Onc work up due to insurance coverage concerns (pt lives in [place] primarily).     Daughter in law, son and patient updated at the bedside.     Diarrhea ongoing, episode of nb emesis on 4/19 am after oral K packet given. SHI worsening, multifactorial, prerenal/nephrotoxicity from vanc/zosn on admission, all considered. She did not get contrast on admission CT. F/u bladder scan w PVRs, repeat UA [ ], cont IVF, check TTE [ ] to eval for endovascular involvement w salmonella bacteremia in geriatric patient.

## 2023-04-21 LAB
-  CIPROFLOXACIN: SIGNIFICANT CHANGE UP
ALBUMIN SERPL ELPH-MCNC: 3.8 G/DL — SIGNIFICANT CHANGE UP (ref 3.3–5)
ALP SERPL-CCNC: 85 U/L — SIGNIFICANT CHANGE UP (ref 40–120)
ALT FLD-CCNC: 19 U/L — SIGNIFICANT CHANGE UP (ref 4–33)
ANION GAP SERPL CALC-SCNC: 15 MMOL/L — HIGH (ref 7–14)
ANION GAP SERPL CALC-SCNC: 17 MMOL/L — HIGH (ref 7–14)
AST SERPL-CCNC: 31 U/L — SIGNIFICANT CHANGE UP (ref 4–32)
BASOPHILS # BLD AUTO: 0.06 K/UL — SIGNIFICANT CHANGE UP (ref 0–0.2)
BASOPHILS NFR BLD AUTO: 0.5 % — SIGNIFICANT CHANGE UP (ref 0–2)
BILIRUB SERPL-MCNC: 0.3 MG/DL — SIGNIFICANT CHANGE UP (ref 0.2–1.2)
BUN SERPL-MCNC: 52 MG/DL — HIGH (ref 7–23)
BUN SERPL-MCNC: 64 MG/DL — HIGH (ref 7–23)
CALCIUM SERPL-MCNC: 8.7 MG/DL — SIGNIFICANT CHANGE UP (ref 8.4–10.5)
CALCIUM SERPL-MCNC: 8.9 MG/DL — SIGNIFICANT CHANGE UP (ref 8.4–10.5)
CHLORIDE SERPL-SCNC: 93 MMOL/L — LOW (ref 98–107)
CHLORIDE SERPL-SCNC: 98 MMOL/L — SIGNIFICANT CHANGE UP (ref 98–107)
CO2 SERPL-SCNC: 16 MMOL/L — LOW (ref 22–31)
CO2 SERPL-SCNC: 16 MMOL/L — LOW (ref 22–31)
CREAT ?TM UR-MCNC: 77 MG/DL — SIGNIFICANT CHANGE UP
CREAT SERPL-MCNC: 1.5 MG/DL — HIGH (ref 0.5–1.3)
CREAT SERPL-MCNC: 2.12 MG/DL — HIGH (ref 0.5–1.3)
CULTURE RESULTS: SIGNIFICANT CHANGE UP
EGFR: 23 ML/MIN/1.73M2 — LOW
EGFR: 35 ML/MIN/1.73M2 — LOW
EOSINOPHIL # BLD AUTO: 0.17 K/UL — SIGNIFICANT CHANGE UP (ref 0–0.5)
EOSINOPHIL NFR BLD AUTO: 1.5 % — SIGNIFICANT CHANGE UP (ref 0–6)
GLUCOSE SERPL-MCNC: 102 MG/DL — HIGH (ref 70–99)
GLUCOSE SERPL-MCNC: 127 MG/DL — HIGH (ref 70–99)
HCT VFR BLD CALC: 39.5 % — SIGNIFICANT CHANGE UP (ref 34.5–45)
HGB BLD-MCNC: 13.9 G/DL — SIGNIFICANT CHANGE UP (ref 11.5–15.5)
IANC: 7 K/UL — SIGNIFICANT CHANGE UP (ref 1.8–7.4)
IMM GRANULOCYTES NFR BLD AUTO: 6.1 % — HIGH (ref 0–0.9)
LYMPHOCYTES # BLD AUTO: 1.9 K/UL — SIGNIFICANT CHANGE UP (ref 1–3.3)
LYMPHOCYTES # BLD AUTO: 17 % — SIGNIFICANT CHANGE UP (ref 13–44)
MAGNESIUM SERPL-MCNC: 2.4 MG/DL — SIGNIFICANT CHANGE UP (ref 1.6–2.6)
MCHC RBC-ENTMCNC: 28.8 PG — SIGNIFICANT CHANGE UP (ref 27–34)
MCHC RBC-ENTMCNC: 35.2 GM/DL — SIGNIFICANT CHANGE UP (ref 32–36)
MCV RBC AUTO: 82 FL — SIGNIFICANT CHANGE UP (ref 80–100)
METHOD TYPE: SIGNIFICANT CHANGE UP
MONOCYTES # BLD AUTO: 1.38 K/UL — HIGH (ref 0–0.9)
MONOCYTES NFR BLD AUTO: 12.3 % — SIGNIFICANT CHANGE UP (ref 2–14)
NEUTROPHILS # BLD AUTO: 7 K/UL — SIGNIFICANT CHANGE UP (ref 1.8–7.4)
NEUTROPHILS NFR BLD AUTO: 62.6 % — SIGNIFICANT CHANGE UP (ref 43–77)
NRBC # BLD: 0 /100 WBCS — SIGNIFICANT CHANGE UP (ref 0–0)
NRBC # FLD: 0 K/UL — SIGNIFICANT CHANGE UP (ref 0–0)
ORGANISM # SPEC MICROSCOPIC CNT: SIGNIFICANT CHANGE UP
OSMOLALITY UR: 378 MOSM/KG — SIGNIFICANT CHANGE UP (ref 50–1200)
PHOSPHATE SERPL-MCNC: 3 MG/DL — SIGNIFICANT CHANGE UP (ref 2.5–4.5)
PLATELET # BLD AUTO: 139 K/UL — LOW (ref 150–400)
POTASSIUM SERPL-MCNC: 2.8 MMOL/L — CRITICAL LOW (ref 3.5–5.3)
POTASSIUM SERPL-MCNC: 3.6 MMOL/L — SIGNIFICANT CHANGE UP (ref 3.5–5.3)
POTASSIUM SERPL-SCNC: 2.8 MMOL/L — CRITICAL LOW (ref 3.5–5.3)
POTASSIUM SERPL-SCNC: 3.6 MMOL/L — SIGNIFICANT CHANGE UP (ref 3.5–5.3)
PROT SERPL-MCNC: 6.9 G/DL — SIGNIFICANT CHANGE UP (ref 6–8.3)
RBC # BLD: 4.82 M/UL — SIGNIFICANT CHANGE UP (ref 3.8–5.2)
RBC # FLD: 13.7 % — SIGNIFICANT CHANGE UP (ref 10.3–14.5)
SODIUM SERPL-SCNC: 126 MMOL/L — LOW (ref 135–145)
SODIUM SERPL-SCNC: 129 MMOL/L — LOW (ref 135–145)
SODIUM UR-SCNC: <20 MMOL/L — SIGNIFICANT CHANGE UP
SPECIMEN SOURCE: SIGNIFICANT CHANGE UP
WBC # BLD: 11.19 K/UL — HIGH (ref 3.8–10.5)
WBC # FLD AUTO: 11.19 K/UL — HIGH (ref 3.8–10.5)

## 2023-04-21 PROCEDURE — 99233 SBSQ HOSP IP/OBS HIGH 50: CPT

## 2023-04-21 PROCEDURE — 76770 US EXAM ABDO BACK WALL COMP: CPT | Mod: 26

## 2023-04-21 RX ORDER — SODIUM CHLORIDE 9 MG/ML
1 INJECTION INTRAMUSCULAR; INTRAVENOUS; SUBCUTANEOUS DAILY
Refills: 0 | Status: DISCONTINUED | OUTPATIENT
Start: 2023-04-21 | End: 2023-04-23

## 2023-04-21 RX ORDER — POTASSIUM CHLORIDE 20 MEQ
40 PACKET (EA) ORAL ONCE
Refills: 0 | Status: COMPLETED | OUTPATIENT
Start: 2023-04-21 | End: 2023-04-21

## 2023-04-21 RX ORDER — SODIUM CHLORIDE 9 MG/ML
1000 INJECTION, SOLUTION INTRAVENOUS
Refills: 0 | Status: DISCONTINUED | OUTPATIENT
Start: 2023-04-21 | End: 2023-04-22

## 2023-04-21 RX ORDER — POTASSIUM CHLORIDE 20 MEQ
20 PACKET (EA) ORAL
Refills: 0 | Status: DISCONTINUED | OUTPATIENT
Start: 2023-04-21 | End: 2023-04-21

## 2023-04-21 RX ADMIN — DORZOLAMIDE HYDROCHLORIDE TIMOLOL MALEATE 1 DROP(S): 20; 5 SOLUTION/ DROPS OPHTHALMIC at 05:55

## 2023-04-21 RX ADMIN — MONTELUKAST 10 MILLIGRAM(S): 4 TABLET, CHEWABLE ORAL at 12:19

## 2023-04-21 RX ADMIN — Medication 3 MILLILITER(S): at 04:31

## 2023-04-21 RX ADMIN — SODIUM CHLORIDE 75 MILLILITER(S): 9 INJECTION, SOLUTION INTRAVENOUS at 22:01

## 2023-04-21 RX ADMIN — SODIUM CHLORIDE 75 MILLILITER(S): 9 INJECTION, SOLUTION INTRAVENOUS at 08:40

## 2023-04-21 RX ADMIN — Medication 50 MILLIEQUIVALENT(S): at 09:37

## 2023-04-21 RX ADMIN — HEPARIN SODIUM 5000 UNIT(S): 5000 INJECTION INTRAVENOUS; SUBCUTANEOUS at 05:52

## 2023-04-21 RX ADMIN — NYSTATIN CREAM 1 APPLICATION(S): 100000 CREAM TOPICAL at 05:54

## 2023-04-21 RX ADMIN — Medication 3 MILLILITER(S): at 00:40

## 2023-04-21 RX ADMIN — SODIUM CHLORIDE 75 MILLILITER(S): 9 INJECTION, SOLUTION INTRAVENOUS at 05:55

## 2023-04-21 RX ADMIN — Medication 500 MILLIGRAM(S): at 21:59

## 2023-04-21 RX ADMIN — HEPARIN SODIUM 5000 UNIT(S): 5000 INJECTION INTRAVENOUS; SUBCUTANEOUS at 21:58

## 2023-04-21 RX ADMIN — SODIUM CHLORIDE 1 GRAM(S): 9 INJECTION INTRAMUSCULAR; INTRAVENOUS; SUBCUTANEOUS at 16:42

## 2023-04-21 RX ADMIN — Medication 500 MILLIGRAM(S): at 12:19

## 2023-04-21 RX ADMIN — ONDANSETRON 4 MILLIGRAM(S): 8 TABLET, FILM COATED ORAL at 12:17

## 2023-04-21 RX ADMIN — CEFTRIAXONE 100 MILLIGRAM(S): 500 INJECTION, POWDER, FOR SOLUTION INTRAMUSCULAR; INTRAVENOUS at 16:43

## 2023-04-21 RX ADMIN — Medication 40 MILLIEQUIVALENT(S): at 12:17

## 2023-04-21 RX ADMIN — Medication 3 MILLILITER(S): at 22:17

## 2023-04-21 RX ADMIN — Medication 500 MILLIGRAM(S): at 05:52

## 2023-04-21 RX ADMIN — MUPIROCIN 1 APPLICATION(S): 20 OINTMENT TOPICAL at 05:34

## 2023-04-21 NOTE — PROGRESS NOTE ADULT - SUBJECTIVE AND OBJECTIVE BOX
Robi Garcia  PGY-1 Resident Physician     Patient is a 83y old  Female who presents with a chief complaint of Diarrhea, vomiting (20 Apr 2023 09:32)    SUBJECTIVE / OVERNIGHT EVENTS:  Reports increased PO intake. Denies abdominal pain, cramping. Still experiencing episodes of diarrhea.     MEDICATIONS  (STANDING):  albuterol/ipratropium for Nebulization 3 milliLiter(s) Nebulizer every 6 hours  cefTRIAXone   IVPB 1000 milliGRAM(s) IV Intermittent every 24 hours  dorzolamide 2%/timolol 0.5% Ophthalmic Solution 1 Drop(s) Both EYES two times a day  heparin   Injectable 5000 Unit(s) SubCutaneous every 8 hours  lactated ringers. 1000 milliLiter(s) (75 mL/Hr) IV Continuous <Continuous>  metroNIDAZOLE    Tablet 500 milliGRAM(s) Oral every 8 hours  montelukast 10 milliGRAM(s) Oral daily  mupirocin 2% Ointment 1 Application(s) Topical two times a day  nystatin Powder 1 Application(s) Topical two times a day  potassium chloride  20 mEq/100 mL IVPB 20 milliEquivalent(s) IV Intermittent every 2 hours    MEDICATIONS  (PRN):  acetaminophen     Tablet .. 650 milliGRAM(s) Oral every 6 hours PRN Temp greater or equal to 38C (100.4F), Mild Pain (1 - 3)  aluminum hydroxide/magnesium hydroxide/simethicone Suspension 30 milliLiter(s) Oral every 4 hours PRN Dyspepsia  melatonin 3 milliGRAM(s) Oral at bedtime PRN Insomnia  ondansetron Injectable 4 milliGRAM(s) IV Push every 8 hours PRN Nausea and/or Vomiting    Allergies    No Known Allergies    Intolerances        Vital Signs Last 24 Hrs  T(C): 36.4 (21 Apr 2023 06:05), Max: 36.6 (20 Apr 2023 09:00)  T(F): 97.6 (21 Apr 2023 06:05), Max: 97.8 (20 Apr 2023 09:00)  HR: 62 (21 Apr 2023 06:05) (62 - 68)  BP: 110/62 (21 Apr 2023 06:05) (96/60 - 110/62)  BP(mean): --  RR: 18 (21 Apr 2023 06:05) (17 - 18)  SpO2: 98% (21 Apr 2023 06:05) (96% - 99%)    Parameters below as of 21 Apr 2023 06:05  Patient On (Oxygen Delivery Method): room air      Daily     Daily   I&O's Summary    20 Apr 2023 07:01  -  21 Apr 2023 07:00  --------------------------------------------------------  IN: 2000 mL / OUT: 0 mL / NET: 2000 mL        Physical Exam  GENERAL: NAD. Well-developed.  NEUROLOGICAL: A&O x 4. CN2-12. Strength, Sensation, ROM wnl. Brachial, ankle, babinski reflex wnl. Cerebellar signs (FTN) wnl. Gait appreciated.                  NECK: No lymphadenopathy. Supple, symmetric and without tracheal deviation.   CARDIAC: RRR w/ normal S1 & S2. No murmurs, rubs. & gallops. Radial & dorsal pedis pulses intact. No carotid bruits.   PULMONARY: CTA b/l w/o accessory muscle use.   GI: Soft, NT, ND, no rebound, no guarding. NABS in 4 quads.   RENAL: No lower extremity edema. No CVA tenderness.     DIAGNOSTICS:                         13.9   11.19 )-----------( 139      ( 21 Apr 2023 05:10 )             39.5     Hgb Trend: 13.9<--, 14.6<--, 15.3<--, 13.8<--, 11.8<--  04-21    126<L>  |  93<L>  |  64<H>  ----------------------------<  102<H>  2.8<LL>   |  16<L>  |  2.12<H>    Ca    8.7      21 Apr 2023 05:10  Phos  3.0     04-21  Mg     2.40     04-21    TPro  6.9  /  Alb  3.8  /  TBili  0.3  /  DBili  x   /  AST  31  /  ALT  19  /  AlkPhos  85  04-21    CAPILLARY BLOOD GLUCOSE        Creatinine Trend: 2.12<--, 3.48<--, 2.64<--, 1.85<--, 2.07<--, 2.57<--  LIVER FUNCTIONS - ( 21 Apr 2023 05:10 )  Alb: 3.8 g/dL / Pro: 6.9 g/dL / ALK PHOS: 85 U/L / ALT: 19 U/L / AST: 31 U/L / GGT: x

## 2023-04-21 NOTE — PROVIDER CONTACT NOTE (CRITICAL VALUE NOTIFICATION) - ASSESSMENT
Pt alert and oriented. No s/s of acute distress at this time.
no diarrhea at this time, last overnight

## 2023-04-21 NOTE — PROGRESS NOTE ADULT - NSPROGADDITIONALINFOA_GEN_ALL_CORE
Greater than 50 minutes spent with patient and on patient's care plan.

## 2023-04-21 NOTE — PROGRESS NOTE ADULT - PROBLEM SELECTOR PLAN 1
-Dx: Septic shock 2/2 salmonella  -presented w/ 10x episodes of vomiting after recent flight from [place]  -s/p 4 x 1L bolus, MAP 50-60 despite aggressive IVF given earlier in ED  -s/p vanc/zosyn in ED  -MICU consulted for hypotension; at this time not MICU candidate  -CT A/P w/o colitis, noted pelvic fluid mass  -Pelvic US confirming pelvic fluid mass  -MRSA swab (-), (+) MSSA colonization   -BCx (+) for salmonella  -TTE w/o endocarditis   > Transitioned from Zosyn (4/17-4/18) to Flagyl/Ceftriaxone (4/18-)  > f/u r/p BCx (4/19) for bacterial clearance  > will require ABE for endocarditis r/o  > family opting to investigate pelvic mass [place]

## 2023-04-21 NOTE — PROGRESS NOTE ADULT - PROBLEM SELECTOR PLAN 2
-Dx: Likely hypovolemic hyponatremia vs SIADH  > r/p serum osm, urine studies -Dx: Likely hypovolemic hyponatremia vs SIADH  > started salt tabs (1 gram daily x 3 days)

## 2023-04-21 NOTE — PROGRESS NOTE ADULT - ATTENDING COMMENTS
Patient seen and examined, care plan discussed with house staff as above.    83yoF admitted w diarrhea in septic shock 2/2 salmonella bacteremia, now improved after 3.5L IVF in the ED. Found to have a 6.3cm diameter pelvic mass (abscess vs malignancy) on CT, no evidence of diverticulitis. Abx narrowed from vanc/zosyn to zosyn on 4/17-4/18, transitioned to CTX/flagyl (4/18- ), f/u salmonella sensitivities [non typhi] and check initial cultures for fluoroquinolone sensitivities [ ], repeat blood cultures 4/19 sent [NGTD ]. TTE without evidence of valvulopathy or veg, but ABE is a more sensitive study. Family deferred ABE for now. Low suspicion for endocarditis or aortitis from salmonella presently as patient has been afebrile.    Gyn consulted for pelvic mass on 4/17, rec's pelvic US to further approximate, shows cystic structure in the left adnexa measuring approximately 6.7 x 4.0 x 4.4 cm. Will need abd/pelvic MRI in outpatient setting to r/o neoplasm, family aware, wants to get this done in OP clinic, declining further inpatient GYN/Onc work up due to insurance coverage concerns (pt lives in [place] primarily).     SHI now improving, does still require IV and oral K supplementation, will also start salt tags 1 g daily due to concern for na losses from GIT, diarrhea is ongoing. Hopeful d/c 4/22 on oral cipro if diarrhea improves and electrolytes stabilize.     Daughter in law, son and patient updated at the bedside. Patient seen and examined, care plan discussed with house staff as above.    83yoF admitted w diarrhea in septic shock 2/2 salmonella bacteremia, now improved after 3.5L IVF in the ED. Found to have a 6.3cm diameter pelvic mass (abscess vs malignancy) on CT, no evidence of diverticulitis. Abx narrowed from vanc/zosyn to zosyn on 4/17-4/18, transitioned to CTX/flagyl (4/18- ), f/u salmonella sensitivities [non typhi] and check initial cultures for fluoroquinolone sensitivities [ ], repeat blood cultures 4/19 sent [NGTD ]. TTE without evidence of valvulopathy or veg, but ABE is a more sensitive study. Family deferred ABE for now. Low suspicion for endocarditis or aortitis from salmonella presently as patient has been afebrile.    Gyn consulted for pelvic mass on 4/17, rec's pelvic US to further approximate, shows cystic structure in the left adnexa measuring approximately 6.7 x 4.0 x 4.4 cm. Will need abd/pelvic MRI in outpatient setting to r/o neoplasm, family aware, wants to get this done in OP clinic, declining further inpatient GYN/Onc work up due to insurance coverage concerns (pt lives in [place] primarily).     SHI now improving, does still require IV and oral K supplementation, will also start salt tabs 1 g daily due to concern for na losses from GIT, diarrhea is ongoing. Urine lytes difficult to interpret given NS admin for the past 3 days. Hopeful d/c 4/22 on oral cipro if diarrhea improves and electrolytes stabilize.     Daughter in law, son and patient updated at the bedside.

## 2023-04-21 NOTE — PROVIDER CONTACT NOTE (CRITICAL VALUE NOTIFICATION) - SITUATION
RN notified provider regarding potassium level 2.8
growth in aerobic bottle, gram - rods; growth in anaerobic bottle, gram - rods

## 2023-04-22 LAB
ALBUMIN SERPL ELPH-MCNC: 3.4 G/DL — SIGNIFICANT CHANGE UP (ref 3.3–5)
ALP SERPL-CCNC: 84 U/L — SIGNIFICANT CHANGE UP (ref 40–120)
ALT FLD-CCNC: 24 U/L — SIGNIFICANT CHANGE UP (ref 4–33)
ANION GAP SERPL CALC-SCNC: 10 MMOL/L — SIGNIFICANT CHANGE UP (ref 7–14)
ANION GAP SERPL CALC-SCNC: 9 MMOL/L — SIGNIFICANT CHANGE UP (ref 7–14)
AST SERPL-CCNC: 35 U/L — HIGH (ref 4–32)
BILIRUB SERPL-MCNC: 0.3 MG/DL — SIGNIFICANT CHANGE UP (ref 0.2–1.2)
BUN SERPL-MCNC: 26 MG/DL — HIGH (ref 7–23)
BUN SERPL-MCNC: 36 MG/DL — HIGH (ref 7–23)
CALCIUM SERPL-MCNC: 8.4 MG/DL — SIGNIFICANT CHANGE UP (ref 8.4–10.5)
CALCIUM SERPL-MCNC: 9 MG/DL — SIGNIFICANT CHANGE UP (ref 8.4–10.5)
CHLORIDE SERPL-SCNC: 101 MMOL/L — SIGNIFICANT CHANGE UP (ref 98–107)
CHLORIDE SERPL-SCNC: 103 MMOL/L — SIGNIFICANT CHANGE UP (ref 98–107)
CO2 SERPL-SCNC: 17 MMOL/L — LOW (ref 22–31)
CO2 SERPL-SCNC: 19 MMOL/L — LOW (ref 22–31)
CREAT SERPL-MCNC: 0.84 MG/DL — SIGNIFICANT CHANGE UP (ref 0.5–1.3)
CREAT SERPL-MCNC: 1.14 MG/DL — SIGNIFICANT CHANGE UP (ref 0.5–1.3)
EGFR: 48 ML/MIN/1.73M2 — LOW
EGFR: 69 ML/MIN/1.73M2 — SIGNIFICANT CHANGE UP
GLUCOSE SERPL-MCNC: 124 MG/DL — HIGH (ref 70–99)
GLUCOSE SERPL-MCNC: 128 MG/DL — HIGH (ref 70–99)
HCT VFR BLD CALC: 36.7 % — SIGNIFICANT CHANGE UP (ref 34.5–45)
HGB BLD-MCNC: 12.8 G/DL — SIGNIFICANT CHANGE UP (ref 11.5–15.5)
MAGNESIUM SERPL-MCNC: 2 MG/DL — SIGNIFICANT CHANGE UP (ref 1.6–2.6)
MAGNESIUM SERPL-MCNC: 2.1 MG/DL — SIGNIFICANT CHANGE UP (ref 1.6–2.6)
MCHC RBC-ENTMCNC: 29 PG — SIGNIFICANT CHANGE UP (ref 27–34)
MCHC RBC-ENTMCNC: 34.9 GM/DL — SIGNIFICANT CHANGE UP (ref 32–36)
MCV RBC AUTO: 83 FL — SIGNIFICANT CHANGE UP (ref 80–100)
NRBC # BLD: 0 /100 WBCS — SIGNIFICANT CHANGE UP (ref 0–0)
NRBC # FLD: 0 K/UL — SIGNIFICANT CHANGE UP (ref 0–0)
PHOSPHATE SERPL-MCNC: 1.7 MG/DL — LOW (ref 2.5–4.5)
PHOSPHATE SERPL-MCNC: 2.3 MG/DL — LOW (ref 2.5–4.5)
PLATELET # BLD AUTO: 142 K/UL — LOW (ref 150–400)
POTASSIUM SERPL-MCNC: 3 MMOL/L — LOW (ref 3.5–5.3)
POTASSIUM SERPL-MCNC: 3.5 MMOL/L — SIGNIFICANT CHANGE UP (ref 3.5–5.3)
POTASSIUM SERPL-SCNC: 3 MMOL/L — LOW (ref 3.5–5.3)
POTASSIUM SERPL-SCNC: 3.5 MMOL/L — SIGNIFICANT CHANGE UP (ref 3.5–5.3)
PROT SERPL-MCNC: 6.4 G/DL — SIGNIFICANT CHANGE UP (ref 6–8.3)
RBC # BLD: 4.42 M/UL — SIGNIFICANT CHANGE UP (ref 3.8–5.2)
RBC # FLD: 13.8 % — SIGNIFICANT CHANGE UP (ref 10.3–14.5)
SODIUM SERPL-SCNC: 129 MMOL/L — LOW (ref 135–145)
SODIUM SERPL-SCNC: 130 MMOL/L — LOW (ref 135–145)
WBC # BLD: 11.24 K/UL — HIGH (ref 3.8–10.5)
WBC # FLD AUTO: 11.24 K/UL — HIGH (ref 3.8–10.5)

## 2023-04-22 PROCEDURE — 99232 SBSQ HOSP IP/OBS MODERATE 35: CPT | Mod: GC

## 2023-04-22 RX ORDER — SODIUM,POTASSIUM PHOSPHATES 278-250MG
1 POWDER IN PACKET (EA) ORAL ONCE
Refills: 0 | Status: COMPLETED | OUTPATIENT
Start: 2023-04-22 | End: 2023-04-22

## 2023-04-22 RX ORDER — POTASSIUM CHLORIDE 20 MEQ
40 PACKET (EA) ORAL ONCE
Refills: 0 | Status: COMPLETED | OUTPATIENT
Start: 2023-04-22 | End: 2023-04-22

## 2023-04-22 RX ORDER — POTASSIUM CHLORIDE 20 MEQ
10 PACKET (EA) ORAL
Refills: 0 | Status: COMPLETED | OUTPATIENT
Start: 2023-04-22 | End: 2023-04-22

## 2023-04-22 RX ORDER — POTASSIUM CHLORIDE 20 MEQ
20 PACKET (EA) ORAL
Refills: 0 | Status: DISCONTINUED | OUTPATIENT
Start: 2023-04-22 | End: 2023-04-22

## 2023-04-22 RX ORDER — CIPROFLOXACIN LACTATE 400MG/40ML
500 VIAL (ML) INTRAVENOUS EVERY 12 HOURS
Refills: 0 | Status: DISCONTINUED | OUTPATIENT
Start: 2023-04-22 | End: 2023-04-23

## 2023-04-22 RX ORDER — SODIUM CHLORIDE 9 MG/ML
1000 INJECTION INTRAMUSCULAR; INTRAVENOUS; SUBCUTANEOUS
Refills: 0 | Status: DISCONTINUED | OUTPATIENT
Start: 2023-04-22 | End: 2023-04-23

## 2023-04-22 RX ADMIN — SODIUM CHLORIDE 75 MILLILITER(S): 9 INJECTION INTRAMUSCULAR; INTRAVENOUS; SUBCUTANEOUS at 10:07

## 2023-04-22 RX ADMIN — Medication 100 MILLIEQUIVALENT(S): at 12:42

## 2023-04-22 RX ADMIN — MONTELUKAST 10 MILLIGRAM(S): 4 TABLET, CHEWABLE ORAL at 11:35

## 2023-04-22 RX ADMIN — MUPIROCIN 1 APPLICATION(S): 20 OINTMENT TOPICAL at 06:34

## 2023-04-22 RX ADMIN — SODIUM CHLORIDE 1 GRAM(S): 9 INJECTION INTRAMUSCULAR; INTRAVENOUS; SUBCUTANEOUS at 11:35

## 2023-04-22 RX ADMIN — Medication 100 MILLIEQUIVALENT(S): at 15:24

## 2023-04-22 RX ADMIN — HEPARIN SODIUM 5000 UNIT(S): 5000 INJECTION INTRAVENOUS; SUBCUTANEOUS at 23:52

## 2023-04-22 RX ADMIN — HEPARIN SODIUM 5000 UNIT(S): 5000 INJECTION INTRAVENOUS; SUBCUTANEOUS at 13:22

## 2023-04-22 RX ADMIN — HEPARIN SODIUM 5000 UNIT(S): 5000 INJECTION INTRAVENOUS; SUBCUTANEOUS at 06:25

## 2023-04-22 RX ADMIN — NYSTATIN CREAM 1 APPLICATION(S): 100000 CREAM TOPICAL at 17:28

## 2023-04-22 RX ADMIN — Medication 1 PACKET(S): at 10:06

## 2023-04-22 RX ADMIN — Medication 40 MILLIEQUIVALENT(S): at 19:03

## 2023-04-22 RX ADMIN — Medication 500 MILLIGRAM(S): at 06:25

## 2023-04-22 RX ADMIN — DORZOLAMIDE HYDROCHLORIDE TIMOLOL MALEATE 1 DROP(S): 20; 5 SOLUTION/ DROPS OPHTHALMIC at 17:28

## 2023-04-22 RX ADMIN — DORZOLAMIDE HYDROCHLORIDE TIMOLOL MALEATE 1 DROP(S): 20; 5 SOLUTION/ DROPS OPHTHALMIC at 10:08

## 2023-04-22 RX ADMIN — Medication 500 MILLIGRAM(S): at 17:28

## 2023-04-22 RX ADMIN — NYSTATIN CREAM 1 APPLICATION(S): 100000 CREAM TOPICAL at 06:35

## 2023-04-22 RX ADMIN — Medication 100 MILLIEQUIVALENT(S): at 11:35

## 2023-04-22 RX ADMIN — MUPIROCIN 1 APPLICATION(S): 20 OINTMENT TOPICAL at 17:28

## 2023-04-22 NOTE — DISCHARGE NOTE PROVIDER - CARE PROVIDER_API CALL
Odette Parra)  Internal Medicine  [address], [address]  [place] [address]  Phone: [phone]  Fax: [phone]  Follow Up Time:

## 2023-04-22 NOTE — PROGRESS NOTE ADULT - PROBLEM SELECTOR PLAN 1
-Dx: Septic shock 2/2 salmonella  -presented w/ 10x episodes of vomiting after recent flight from [place]  -s/p 4 x 1L bolus, MAP 50-60 despite aggressive IVF given earlier in ED  -s/p vanc/zosyn in ED  -MICU consulted for hypotension; at this time not MICU candidate  -CT A/P w/o colitis, noted pelvic fluid mass  -Pelvic US confirming pelvic fluid mass  -MRSA swab (-), (+) MSSA colonization   -BCx (+) for salmonella  -TTE w/o endocarditis   -BCx (4/19): cleared salmonella  > Transitioned from Zosyn (4/17-4/18) to Flagyl/Ceftriaxone (4/18-)  > will require ABE for endocarditis r/o  > family opting [place] investigate [place] -Dx: Septic shock 2/2 salmonella  -presented w/ 10x episodes of vomiting after recent flight from [place]  -s/p 4 x 1L bolus, MAP 50-60 despite aggressive IVF given earlier in ED  -s/p vanc/zosyn in ED  -MICU consulted for hypotension; at this time not MICU candidate  -CT A/P w/o colitis, noted pelvic fluid mass  -Pelvic US confirming pelvic fluid mass  -MRSA swab (-), (+) MSSA colonization   -BCx (+) for salmonella  -TTE w/o endocarditis   -BCx (4/19): cleared salmonella  > Transitioned from Zosyn (4/17-4/18) to Flagyl/Ceftriaxone (4/18-4/22; 4 days) to Cipro (4/22-5/2)  > will require ABE for endocarditis r/o  > family opting [place] investigate pelvic mass [place]

## 2023-04-22 NOTE — DISCHARGE NOTE PROVIDER - NSDCMRMEDTOKEN_GEN_ALL_CORE_FT
ciprofloxacin 500 mg oral tablet: 1 tab(s) orally 2 times a day Take once in the morning and once in the evening. Start 4/23/23 evening.  dorzolamide-timolol 2%-0.5% ophthalmic solution: 1 drop(s) in each eye once a day  Singulair 10 mg oral tablet: 1 tab(s) orally once a day  Voltaren 75 mg oral delayed release tablet: 1 tab(s) orally as needed for  pain   albuterol 90 mcg/inh inhalation aerosol: 2 puff(s) inhaled 4 times a day as needed for  shortness of breath and/or wheezing  ciprofloxacin 500 mg oral tablet: 1 tab(s) orally 2 times a day Take once in the morning and once in the evening. Start 4/23/23 evening.  dorzolamide-timolol 2%-0.5% ophthalmic solution: 1 drop(s) in each eye once a day  Singulair 10 mg oral tablet: 1 tab(s) orally once a day  Trelegy Ellipta 100 mcg-62.5 mcg-25 mcg/inh inhalation powder: 1 inhaled 2 times a day  Voltaren 75 mg oral delayed release tablet: 1 tab(s) orally as needed for  pain

## 2023-04-22 NOTE — DISCHARGE NOTE PROVIDER - NSDCCPCAREPLAN_GEN_ALL_CORE_FT
PRINCIPAL DISCHARGE DIAGNOSIS  Diagnosis: Septic shock  Assessment and Plan of Treatment: When you came into the ED, your blood pressure was very low. It was deemed to be low as you were growing an infection (Salmonella) that was causing you to throw up & have diarrhea. We provided antibiotics that cleared the bacteria. Your blood pressure and appetite returned to normal. You will need to finish a 9 day course of antibiotics (Ciprofloxacin, once in AM, once in PM) after you are discharged from the hospital. Please follow up with your PCP after completion or if symtoms worsen.      SECONDARY DISCHARGE DIAGNOSES  Diagnosis: Pelvic mass  Assessment and Plan of Treatment: Upon arriving to the ED, it was noted that you had a pelvic mass as seen on our CT scans. It was decided by family that further work [place] of the pelvic [place]. Please follow up with a gynecologist to evaluate the mass.     PRINCIPAL DISCHARGE DIAGNOSIS  Diagnosis: Septic shock  Assessment and Plan of Treatment: When you came into the ED, your blood pressure was very low. It was deemed to be low as you were found to have an infection (Salmonella) that was causing you to throw up & have diarrhea. We provided antibiotics that cleared the bacteria. Your blood pressure and appetite returned to normal. You will need to finish a 9 day course of antibiotics (Ciprofloxacin, once in AM, once in PM) after you are discharged from the hospital. Please follow up with your PCP after completion or if symtoms worsen.      SECONDARY DISCHARGE DIAGNOSES  Diagnosis: Pelvic mass  Assessment and Plan of Treatment: Upon arriving to the ED, it was noted that you had a pelvic mass as seen on our CT scans. It was decided by family that further work up of the pelvic [place]. Please follow up with a gynecologist to evaluate the mass.    Diagnosis: HTN (hypertension)  Assessment and Plan of Treatment: You havea history of high blood pressure. While you were in the hospital your blood pressure was very low as a result of your infection and we did not give you your blood pressure medication (valsartan-hydrochlorothiazide). Please refrain from taking this medication until you have your BP checked and see your PCP.

## 2023-04-22 NOTE — DISCHARGE NOTE PROVIDER - NSFOLLOWUPCLINICS_GEN_ALL_ED_FT
[facility] [place]  Internal Medicine  [address] [place] [address]  Phone: [phone]  Fax: [phone]  Follow Up Time: 2 weeks

## 2023-04-22 NOTE — PROGRESS NOTE ADULT - ATTENDING COMMENTS
Septic shock present on admission 2/2 salmonella bacteremia, septic shock now resolved, s/p IVF, low suspicion for endocarditis or aortitis from salmonella, switch antibiotics to PO Cipro   Pelvic mass, pelvic US shows cystic structure in the left adnexa, needs outpatient MRI A/P   Pre-renal SHI in setting of diarrhea, Cr improving s/p IV fluids   Hypovolemic hyponatremia, c/w IV fluids and monitor Na   Hypokalemia d/t GI losses, supplement K

## 2023-04-22 NOTE — DISCHARGE NOTE PROVIDER - NSDCFUADDAPPT_GEN_ALL_CORE_FT
Please follow up with your PCP within 2 weeks of discharge from hospital.  Please follow up with your PCP within 2 weeks of discharge from hospital. If you are unable to schedule an appointment then please call [phone] to schedule and appointment with a new physician.

## 2023-04-22 NOTE — DISCHARGE NOTE PROVIDER - NSDCCPTREATMENT_GEN_ALL_CORE_FT
PRINCIPAL PROCEDURE  Procedure: CT abdomen and pelvis  Findings and Treatment: FINDINGS:  LOWER CHEST: Bilateral lower lobe subsegmental atelectasis.  LIVER: Within normal limits.  BILE DUCTS: Dilated common bile duct at 14 mm tapers to the ampulla. No   choledocholithiasis. This is likely secondary to postcholecystectomy   state.  GALLBLADDER: Cholecystectomy.  SPLEEN: Within normal limits.  PANCREAS: Within normal limits.  ADRENALS: 1.3 cm left adrenal adenoma.  KIDNEYS/URETERS: Within normal limits.  BLADDER: Within normal limits.  REPRODUCTIVE ORGANS: There is a thick-walled fluid collection with a   peripheral calcification posterior and adjacent to the uterus measuring   6.3 x 4.6 cm (602:57).  BOWEL: Fluid-filled colon consistent with history of diarrhea. Mild   sigmoid diverticulosis without diverticulitis. No bowel obstruction.   Appendix is not visualized. No evidence of inflammation in the pericecal   region.  PERITONEUM: No ascites.  VESSELS: Atherosclerotic changes.  RETROPERITONEUM/LYMPH NODES: No lymphadenopathy.  ABDOMINAL WALL: Within normal limits.  BONES: Degenerative changes.  IMPRESSION:  Fluid-filled colon consistent with history of diarrhea. No colonic wall   thickening.  Dilated common bile duct at 14 mm tapers to the ampulla. No   choledocholithiasis. This is likely secondary to postcholecystectomy   state. Correlate clinically for biliary disease.  A 6.3 cm thick-walled pelvic fluid collection posterior and adjacent to   the uterus, possibly related to the left adnexa. Neoplasm is not   excluded. Further evaluation with pelvic ultrasound is recommended.        SECONDARY PROCEDURE  Procedure: US, pelvis, complete  Findings and Treatment: FINDINGS:  Technically difficult and limited exam due to transabdominal technique   and overlying bowel gas.  Uterus: 6.9 cm x 3.2 cm x 4.7 cm. Partially obscured by bowel gas.   Visualized portions are normal limits.  Endometrium: Limited visualization. Visualized portions are within normal   limits, measuring 4 mm in thickness.  Right ovary: Not visualized.  Left ovary: Not visualized.  Partially visualized cystic structure in the left posterior adnexa,   posterior to the uterus, measuring approximately 6.7 x 4.0 x 4.4 cm.  Fluid: None.  IMPRESSION:  Limited transabdominal pelvic sonography.  Partially visualized and incompletely evaluated cystic structure in the   left adnexa measuring approximately 6.7 x 4.0 x 4.4 cm, corresponding to   the recent CT finding. Neoplasm is not excluded. Contrast enhanced pelvic   MRI can be performed for further characterization as warranted.  Nonvisualization of the ovaries.  Limited visualization of the uterus without gross abnormalit

## 2023-04-22 NOTE — DISCHARGE NOTE PROVIDER - CARE PROVIDERS DIRECT ADDRESSES
[email or web address]

## 2023-04-22 NOTE — PROGRESS NOTE ADULT - SUBJECTIVE AND OBJECTIVE BOX
Robi Garcia  PGY-1 Resident Physician     Patient is a 83y old  Female who presents with a chief complaint of Diarrhea, vomiting (21 Apr 2023 08:23)      SUBJECTIVE / OVERNIGHT EVENTS:  NAEON. Reported improved diarrhea from prior. Remaining ROS (-).        MEDICATIONS  (STANDING):  albuterol/ipratropium for Nebulization 3 milliLiter(s) Nebulizer every 6 hours  cefTRIAXone   IVPB 1000 milliGRAM(s) IV Intermittent every 24 hours  dorzolamide 2%/timolol 0.5% Ophthalmic Solution 1 Drop(s) Both EYES two times a day  heparin   Injectable 5000 Unit(s) SubCutaneous every 8 hours  lactated ringers. 1000 milliLiter(s) (75 mL/Hr) IV Continuous <Continuous>  metroNIDAZOLE    Tablet 500 milliGRAM(s) Oral every 8 hours  montelukast 10 milliGRAM(s) Oral daily  mupirocin 2% Ointment 1 Application(s) Topical two times a day  nystatin Powder 1 Application(s) Topical two times a day  potassium chloride  20 mEq/100 mL IVPB 20 milliEquivalent(s) IV Intermittent every 2 hours  potassium phosphate / sodium phosphate Powder (PHOS-NaK) 1 Packet(s) Oral once  sodium chloride 1 Gram(s) Oral daily    MEDICATIONS  (PRN):  acetaminophen     Tablet .. 650 milliGRAM(s) Oral every 6 hours PRN Temp greater or equal to 38C (100.4F), Mild Pain (1 - 3)  aluminum hydroxide/magnesium hydroxide/simethicone Suspension 30 milliLiter(s) Oral every 4 hours PRN Dyspepsia  melatonin 3 milliGRAM(s) Oral at bedtime PRN Insomnia  ondansetron Injectable 4 milliGRAM(s) IV Push every 8 hours PRN Nausea and/or Vomiting    Allergies    No Known Allergies    Intolerances        Vital Signs Last 24 Hrs  T(C): 36.5 (22 Apr 2023 05:25), Max: 36.8 (21 Apr 2023 12:25)  T(F): 97.7 (22 Apr 2023 05:25), Max: 98.2 (21 Apr 2023 12:25)  HR: 59 (22 Apr 2023 05:25) (59 - 67)  BP: 94/50 (22 Apr 2023 05:25) (94/50 - 102/56)  BP(mean): --  RR: 18 (22 Apr 2023 05:25) (17 - 18)  SpO2: 98% (22 Apr 2023 05:25) (96% - 98%)    Parameters below as of 22 Apr 2023 05:25  Patient On (Oxygen Delivery Method): room air      Daily     Daily   I&O's Summary      Physical Exam  GENERAL: NAD. Well-developed.  NEUROLOGICAL: A&O x 4. CN2-12. Strength, Sensation, ROM wnl. Brachial, ankle, babinski reflex wnl. Cerebellar signs (FTN) wnl. Gait appreciated.                  NECK: No lymphadenopathy. Supple, symmetric and without tracheal deviation.   CARDIAC: RRR w/ normal S1 & S2. No murmurs, rubs. & gallops. Radial & dorsal pedis pulses intact. No carotid bruits.   PULMONARY: CTA b/l w/o accessory muscle use.   GI: Soft, NT, ND, no rebound, no guarding. NABS in 4 quads.   RENAL: No lower extremity edema. No CVA tenderness.   DIAGNOSTICS:                         12.8   11.24 )-----------( 142      ( 22 Apr 2023 06:59 )             36.7     Hgb Trend: 12.8<--, 13.9<--, 14.6<--, 15.3<--, 13.8<--  04-22    129<L>  |  101  |  36<H>  ----------------------------<  124<H>  3.0<L>   |  19<L>  |  1.14    Ca    9.0      22 Apr 2023 06:59  Phos  2.3     04-22  Mg     2.10     04-22    TPro  6.4  /  Alb  3.4  /  TBili  0.3  /  DBili  x   /  AST  35<H>  /  ALT  24  /  AlkPhos  84  04-22    CAPILLARY BLOOD GLUCOSE        Creatinine Trend: 1.14<--, 1.50<--, 2.12<--, 3.48<--, 2.64<--, 1.85<--  LIVER FUNCTIONS - ( 22 Apr 2023 06:59 )  Alb: 3.4 g/dL / Pro: 6.4 g/dL / ALK PHOS: 84 U/L / ALT: 24 U/L / AST: 35 U/L / GGT: x

## 2023-04-22 NOTE — DISCHARGE NOTE PROVIDER - HOSPITAL COURSE
83F PMH HTN and asthma presents with acute onset diarrhea and vomiting each 10 times already since yesterday (Sunday). Patient arrived to [place] from [place] on Saturday. She was otherwise in her usual state of health and rest of family was doing ok. Patient did not eat anything out of the ordinary recently and suspects it was something in the airplane. Her vomitus was nonbloody and nonbilious. In the ED, 74/50 -> 94/48, T 100.9. 74/50 -> 94/48, T 100.9, bandemia of 33%, lactic acidosis without much response to IVF (4 x 1L bolus). Given Zosyn in ED. CT A/P showing pelvic mass. Admitted to medicine for w/u of sepsis of unknown etiology.     During admission, patient received fluids w/ noted improvement of BP to pt's BL (~100/60). BCx relevant for growth of Salmonella. Pr transitioned to ceft/flagyl d/t sensitivities. Further transitioned to Cipro. TTE for endocarditis (-). Family refused w/u of pelvic mass at this time. Given that mass unlikely to be infectious source, no further w/u was recommended at this time. Pt experienced resolution of diarrhea & emesis. On day of d/c, pt was afebrile & hemodynamically stable.     Upon d/c, pt will require the following:   -9 day course of Cipro (BID) to finish abx course  -Gyn f/u for w/u of pelvic mass 83F PMH HTN and asthma presents with acute onset diarrhea and vomiting. Patient arrived to [place] from [place] on Saturday. She was otherwise in her usual state of health and rest of family was doing ok. Patient did not eat anything out of the ordinary recently and suspects it was something in the airplane. Her vomitus was nonbloody and nonbilious. In the ED, 74/50 -> 94/48, T 100.9. 74/50 -> 94/48, T 100.9, bandemia of 33%, lactic acidosis without much response to IVF (4 x 1L bolus). Given Zosyn in ED. CT A/P showing pelvic mass. Admitted to medicine for w/u of sepsis of unknown etiology.     During admission, patient received fluids w/ noted improvement of BP to pt's BL (~100/60). Septic shock present on admission 2/2 salmonella bacteremia, septic shock resolved s/p IVF. BCx relevant for growth of Salmonella. Pr transitioned to ceft/flagyl d/t sensitivities. Further transitioned to Cipro. TTE for endocarditis (-). Family refused w/u of pelvic mass at this time. Given that mass unlikely to be infectious source, no further w/u was recommended at this time. Also w/ pre-renal SHI in setting of vomiting and diarrhea, Cr improved w/ IVF. Pt experienced resolution of diarrhea & emesis. On day of d/c, pt was afebrile & hemodynamically stable.     Upon d/c, pt will require the following:   -9 day course of Cipro (BID) to finish abx course  -Gyn f/u for w/u of pelvic mass

## 2023-04-23 VITALS
OXYGEN SATURATION: 96 % | DIASTOLIC BLOOD PRESSURE: 61 MMHG | RESPIRATION RATE: 18 BRPM | TEMPERATURE: 98 F | HEART RATE: 61 BPM | SYSTOLIC BLOOD PRESSURE: 107 MMHG

## 2023-04-23 LAB
ALBUMIN SERPL ELPH-MCNC: 3.1 G/DL — LOW (ref 3.3–5)
ALP SERPL-CCNC: 76 U/L — SIGNIFICANT CHANGE UP (ref 40–120)
ALT FLD-CCNC: 20 U/L — SIGNIFICANT CHANGE UP (ref 4–33)
ANION GAP SERPL CALC-SCNC: 10 MMOL/L — SIGNIFICANT CHANGE UP (ref 7–14)
AST SERPL-CCNC: 23 U/L — SIGNIFICANT CHANGE UP (ref 4–32)
BILIRUB SERPL-MCNC: 0.3 MG/DL — SIGNIFICANT CHANGE UP (ref 0.2–1.2)
BUN SERPL-MCNC: 19 MG/DL — SIGNIFICANT CHANGE UP (ref 7–23)
CALCIUM SERPL-MCNC: 8.2 MG/DL — LOW (ref 8.4–10.5)
CHLORIDE SERPL-SCNC: 105 MMOL/L — SIGNIFICANT CHANGE UP (ref 98–107)
CO2 SERPL-SCNC: 16 MMOL/L — LOW (ref 22–31)
CREAT SERPL-MCNC: 0.83 MG/DL — SIGNIFICANT CHANGE UP (ref 0.5–1.3)
EGFR: 70 ML/MIN/1.73M2 — SIGNIFICANT CHANGE UP
GLUCOSE SERPL-MCNC: 110 MG/DL — HIGH (ref 70–99)
MAGNESIUM SERPL-MCNC: 1.9 MG/DL — SIGNIFICANT CHANGE UP (ref 1.6–2.6)
PHOSPHATE SERPL-MCNC: 1.8 MG/DL — LOW (ref 2.5–4.5)
POTASSIUM SERPL-MCNC: 3.7 MMOL/L — SIGNIFICANT CHANGE UP (ref 3.5–5.3)
POTASSIUM SERPL-SCNC: 3.7 MMOL/L — SIGNIFICANT CHANGE UP (ref 3.5–5.3)
PROT SERPL-MCNC: 6 G/DL — SIGNIFICANT CHANGE UP (ref 6–8.3)
SODIUM SERPL-SCNC: 131 MMOL/L — LOW (ref 135–145)

## 2023-04-23 PROCEDURE — 99239 HOSP IP/OBS DSCHRG MGMT >30: CPT

## 2023-04-23 RX ORDER — FLUTICASONE FUROATE, UMECLIDINIUM BROMIDE AND VILANTEROL TRIFENATATE 200; 62.5; 25 UG/1; UG/1; UG/1
1 POWDER RESPIRATORY (INHALATION)
Refills: 0 | DISCHARGE

## 2023-04-23 RX ORDER — CIPROFLOXACIN LACTATE 400MG/40ML
1 VIAL (ML) INTRAVENOUS
Qty: 18 | Refills: 0
Start: 2023-04-23 | End: 2023-05-01

## 2023-04-23 RX ORDER — ALBUTEROL 90 UG/1
2 AEROSOL, METERED ORAL
Refills: 0 | DISCHARGE

## 2023-04-23 RX ORDER — POTASSIUM PHOSPHATE, MONOBASIC POTASSIUM PHOSPHATE, DIBASIC 236; 224 MG/ML; MG/ML
30 INJECTION, SOLUTION INTRAVENOUS ONCE
Refills: 0 | Status: COMPLETED | OUTPATIENT
Start: 2023-04-23 | End: 2023-04-23

## 2023-04-23 RX ADMIN — Medication 500 MILLIGRAM(S): at 06:02

## 2023-04-23 RX ADMIN — MUPIROCIN 1 APPLICATION(S): 20 OINTMENT TOPICAL at 17:39

## 2023-04-23 RX ADMIN — MONTELUKAST 10 MILLIGRAM(S): 4 TABLET, CHEWABLE ORAL at 13:04

## 2023-04-23 RX ADMIN — DORZOLAMIDE HYDROCHLORIDE TIMOLOL MALEATE 1 DROP(S): 20; 5 SOLUTION/ DROPS OPHTHALMIC at 06:01

## 2023-04-23 RX ADMIN — MUPIROCIN 1 APPLICATION(S): 20 OINTMENT TOPICAL at 06:01

## 2023-04-23 RX ADMIN — Medication 500 MILLIGRAM(S): at 17:38

## 2023-04-23 RX ADMIN — NYSTATIN CREAM 1 APPLICATION(S): 100000 CREAM TOPICAL at 17:39

## 2023-04-23 RX ADMIN — NYSTATIN CREAM 1 APPLICATION(S): 100000 CREAM TOPICAL at 06:12

## 2023-04-23 RX ADMIN — DORZOLAMIDE HYDROCHLORIDE TIMOLOL MALEATE 1 DROP(S): 20; 5 SOLUTION/ DROPS OPHTHALMIC at 17:40

## 2023-04-23 RX ADMIN — HEPARIN SODIUM 5000 UNIT(S): 5000 INJECTION INTRAVENOUS; SUBCUTANEOUS at 13:04

## 2023-04-23 RX ADMIN — HEPARIN SODIUM 5000 UNIT(S): 5000 INJECTION INTRAVENOUS; SUBCUTANEOUS at 06:02

## 2023-04-23 RX ADMIN — POTASSIUM PHOSPHATE, MONOBASIC POTASSIUM PHOSPHATE, DIBASIC 83.33 MILLIMOLE(S): 236; 224 INJECTION, SOLUTION INTRAVENOUS at 10:03

## 2023-04-23 NOTE — PROVIDER CONTACT NOTE (OTHER) - RECOMMENDATIONS
Continue to monitor pt
Notify MD.
MD evaluation
MD to evaluate pt. Continue to monitor pt BP.
MD to evaluate pt. Continue to monitor.
Notify MD.
Notify MD.
Continue to monitor pt
Notify MD
Notify MD.

## 2023-04-23 NOTE — PROVIDER CONTACT NOTE (OTHER) - ACTION/TREATMENT ORDERED:
MD notified.
MD notified.
Pt safety maintained, will continue to monitor.
MD notified, will continue to monitor.
MD notified.
MD boswell.
MD said to continue to monitor pt
No interventions ordered at this time.
No interventions ordered at this time.
MD boswell
MD said to continue to monitor pt at this time.
MD notified.
MD notified.
MD notified. Will continue to monitor.

## 2023-04-23 NOTE — PROVIDER CONTACT NOTE (OTHER) - SITUATION
Pt /53.
Pts BP is 102/51 at this time
Pt BP 94/50
Pt is complaining of abdominal discomfort. pt stated discomfort stated after taking KCL pills.
Pt's /47 HR 89
Pt's /50 HR 82
Pts BP is 101/54 at this time
Unable to collect urine, pt has BM at the time of urination. Pt also refused primafit.
Pt's /48 HR 89
Pt BP is 100/38.
Pt BP is 92/56
Pt refused Potassium chloride tablet, pt stated the pill caused stomach discomfort yesterday.
Pt's BP 99/51 HR 75
Pt's BP is 94/50

## 2023-04-23 NOTE — PROGRESS NOTE ADULT - PROBLEM SELECTOR PLAN 3
-currently asymptomatic  > duonebs q6h standing -currently asymptomatic  > duonebs q6h standing  -No wheezing on exam, can continue home singulair and ventolin

## 2023-04-23 NOTE — PROGRESS NOTE ADULT - SUBJECTIVE AND OBJECTIVE BOX
China Craig, PGY-3  Internal Medicine  Pager: [phone]/JENNIFER [phone]    PROGRESS NOTE:     Patient is a 83y old  Female who presents with a chief complaint of Diarrhea, vomiting (22 Apr 2023 14:13)      SUBJECTIVE / OVERNIGHT EVENTS:    ADDITIONAL REVIEW OF SYSTEMS:    MEDICATIONS  (STANDING):  albuterol/ipratropium for Nebulization 3 milliLiter(s) Nebulizer every 6 hours  ciprofloxacin     Tablet 500 milliGRAM(s) Oral every 12 hours  dorzolamide 2%/timolol 0.5% Ophthalmic Solution 1 Drop(s) Both EYES two times a day  heparin   Injectable 5000 Unit(s) SubCutaneous every 8 hours  montelukast 10 milliGRAM(s) Oral daily  mupirocin 2% Ointment 1 Application(s) Topical two times a day  nystatin Powder 1 Application(s) Topical two times a day  sodium chloride 1 Gram(s) Oral daily  sodium chloride 0.9%. 1000 milliLiter(s) (75 mL/Hr) IV Continuous <Continuous>    MEDICATIONS  (PRN):  acetaminophen     Tablet .. 650 milliGRAM(s) Oral every 6 hours PRN Temp greater or equal to 38C (100.4F), Mild Pain (1 - 3)  aluminum hydroxide/magnesium hydroxide/simethicone Suspension 30 milliLiter(s) Oral every 4 hours PRN Dyspepsia  melatonin 3 milliGRAM(s) Oral at bedtime PRN Insomnia  ondansetron Injectable 4 milliGRAM(s) IV Push every 8 hours PRN Nausea and/or Vomiting      CAPILLARY BLOOD GLUCOSE        I&O's Summary      PHYSICAL EXAM:  Vital Signs Last 24 Hrs  T(C): 36.6 (23 Apr 2023 01:00), Max: 37 (22 Apr 2023 09:00)  T(F): 97.9 (23 Apr 2023 01:00), Max: 98.6 (22 Apr 2023 09:00)  HR: 64 (23 Apr 2023 05:29) (62 - 97)  BP: 113/51 (23 Apr 2023 05:29) (92/56 - 114/80)  BP(mean): --  RR: 18 (23 Apr 2023 05:29) (18 - 18)  SpO2: 96% (23 Apr 2023 05:29) (95% - 98%)    Parameters below as of 23 Apr 2023 05:29  Patient On (Oxygen Delivery Method): room air        GENERAL: NAD. Well-developed.  NEUROLOGICAL: A&O x 4. CN2-12. Strength, Sensation, ROM wnl. Brachial, ankle, babinski reflex wnl. Cerebellar signs (FTN) wnl. Gait appreciated.                  NECK: No lymphadenopathy. Supple, symmetric and without tracheal deviation.   CARDIAC: RRR w/ normal S1 & S2. No murmurs, rubs. & gallops. Radial & dorsal pedis pulses intact. No carotid bruits.   PULMONARY: CTA b/l w/o accessory muscle use.   GI: Soft, NT, ND, no rebound, no guarding. NABS in 4 quads.   RENAL: No lower extremity edema. No CVA tenderness.     LABS:                        12.8   11.24 )-----------( 142      ( 22 Apr 2023 06:59 )             36.7     04-22    130<L>  |  103  |  26<H>  ----------------------------<  128<H>  3.5   |  17<L>  |  0.84    Ca    8.4      22 Apr 2023 16:30  Phos  1.7     04-22  Mg     2.00     04-22    TPro  6.4  /  Alb  3.4  /  TBili  0.3  /  DBili  x   /  AST  35<H>  /  ALT  24  /  AlkPhos  84  04-22                RADIOLOGY & ADDITIONAL TESTS:  Results Reviewed:   Imaging Personally Reviewed:  Electrocardiogram Personally Reviewed:    COORDINATION OF CARE:  Care Discussed with Consultants/Other Providers [Y/N]:  Prior or Outpatient Records Reviewed [Y/N]:   China Craig, PGY-3  Internal Medicine  Pager: [phone]/JENNIFER [phone]    PROGRESS NOTE:     Patient is a 83y old  Female who presents with a chief complaint of Diarrhea, vomiting (22 Apr 2023 14:13)      SUBJECTIVE / OVERNIGHT EVENTS: No acute events overnight. Pt seen and examined at bedside. Daughter in law bedside providing translation. States she only had 2 watery BMs, once overnight and then this AM, reduced frequency from admission. Denies fever, chills, nausea, vomiting, abdominal pain, cp, or sob. Increased appetite and feels well. Ambulating to bathroom without assistance per patient.     ADDITIONAL REVIEW OF SYSTEMS: Above    MEDICATIONS  (STANDING):  albuterol/ipratropium for Nebulization 3 milliLiter(s) Nebulizer every 6 hours  ciprofloxacin     Tablet 500 milliGRAM(s) Oral every 12 hours  dorzolamide 2%/timolol 0.5% Ophthalmic Solution 1 Drop(s) Both EYES two times a day  heparin   Injectable 5000 Unit(s) SubCutaneous every 8 hours  montelukast 10 milliGRAM(s) Oral daily  mupirocin 2% Ointment 1 Application(s) Topical two times a day  nystatin Powder 1 Application(s) Topical two times a day  sodium chloride 1 Gram(s) Oral daily  sodium chloride 0.9%. 1000 milliLiter(s) (75 mL/Hr) IV Continuous <Continuous>    MEDICATIONS  (PRN):  acetaminophen     Tablet .. 650 milliGRAM(s) Oral every 6 hours PRN Temp greater or equal to 38C (100.4F), Mild Pain (1 - 3)  aluminum hydroxide/magnesium hydroxide/simethicone Suspension 30 milliLiter(s) Oral every 4 hours PRN Dyspepsia  melatonin 3 milliGRAM(s) Oral at bedtime PRN Insomnia  ondansetron Injectable 4 milliGRAM(s) IV Push every 8 hours PRN Nausea and/or Vomiting      CAPILLARY BLOOD GLUCOSE        I&O's Summary      PHYSICAL EXAM:  Vital Signs Last 24 Hrs  T(C): 36.6 (23 Apr 2023 01:00), Max: 37 (22 Apr 2023 09:00)  T(F): 97.9 (23 Apr 2023 01:00), Max: 98.6 (22 Apr 2023 09:00)  HR: 64 (23 Apr 2023 05:29) (62 - 97)  BP: 113/51 (23 Apr 2023 05:29) (92/56 - 114/80)  BP(mean): --  RR: 18 (23 Apr 2023 05:29) (18 - 18)  SpO2: 96% (23 Apr 2023 05:29) (95% - 98%)    Parameters below as of 23 Apr 2023 05:29  Patient On (Oxygen Delivery Method): room air        GENERAL: NAD. Well-developed female, lying in bed  HEENT: NC/ATCARDIAC: RRR w/ normal S1 & S2. No murmurs, rubs. & gallops.  PULMONARY: CTA b/l w/o accessory muscle use.   GI: Soft, NT, ND, no rebound, no guarding. NABS in 4 quads.  EXTREMITIES: intact pulses, no peripheral edema   NEUROLOGICAL: A&O x 4. Non focal exam      LABS:                        12.8   11.24 )-----------( 142      ( 22 Apr 2023 06:59 )             36.7     04-22    130<L>  |  103  |  26<H>  ----------------------------<  128<H>  3.5   |  17<L>  |  0.84    Ca    8.4      22 Apr 2023 16:30  Phos  1.7     04-22  Mg     2.00     04-22    TPro  6.4  /  Alb  3.4  /  TBili  0.3  /  DBili  x   /  AST  35<H>  /  ALT  24  /  AlkPhos  84  04-22                RADIOLOGY & ADDITIONAL TESTS:  Results Reviewed:   Imaging Personally Reviewed:  Electrocardiogram Personally Reviewed:    COORDINATION OF CARE:  Care Discussed with Consultants/Other Providers [Y/N]:  Prior or Outpatient Records Reviewed [Y/N]:

## 2023-04-23 NOTE — PROGRESS NOTE ADULT - PROBLEM SELECTOR PLAN 4
-DVT Ppx: heparin  -Diet: regular  -Code Status:   -Dispo: inpt mgmt -DVT Ppx: heparin  -Diet: regular  -Code Status: FULL   -Dispo: No needs

## 2023-04-23 NOTE — PROGRESS NOTE ADULT - REASON FOR ADMISSION
Diarrhea, vomiting

## 2023-04-23 NOTE — PROGRESS NOTE ADULT - PROBLEM SELECTOR PLAN 1
-Dx: Septic shock 2/2 salmonella  -presented w/ 10x episodes of vomiting after recent flight from [place]  -s/p 4 x 1L bolus, MAP 50-60 despite aggressive IVF given earlier in ED  -s/p vanc/zosyn in ED  -MICU consulted for hypotension; at this time not MICU candidate  -CT A/P w/o colitis, noted pelvic fluid mass  -Pelvic US confirming pelvic fluid mass  -MRSA swab (-), (+) MSSA colonization   -BCx (+) for salmonella  -TTE w/o endocarditis   -BCx (4/19): cleared salmonella  > Transitioned from Zosyn (4/17-4/18) to Flagyl/Ceftriaxone (4/18-4/22; 4 days) to Cipro (4/22-5/2)  > will require ABE for endocarditis r/o  > family opting [place] investigate [place] as [place]

## 2023-04-23 NOTE — PROVIDER CONTACT NOTE (OTHER) - NAME OF MD/NP/PA/DO NOTIFIED:
MD Robi Garcia
MD Robi Garcia
Vazquez Claire MD
Vazquez Claire MD
MD Robi Garcia
MD Yessica Tucker
MD Yessica Tucker
MD Robi Garcia
Robi Garcia MD
Vazquez Berger
Vazquez Claire MD
Virgil Heath MD

## 2023-04-23 NOTE — PROGRESS NOTE ADULT - ATTENDING COMMENTS
Septic shock present on admission 2/2 salmonella bacteremia, septic shock now resolved, s/p IVF, low suspicion for endocarditis or aortitis from salmonella, switch antibiotics to PO Cipro   Pelvic mass, pelvic US shows cystic structure in the left adnexa, needs outpatient MRI A/P   Pre-renal SHI in setting of diarrhea, Cr improved s/p IV fluids   Hypovolemic hyponatremia, improved s/p IV fluids, monitor Na   Hypokalemia and hypophosphatemia d/t GI losses, supplement K and phos prn  DC home, d/c time 38 minutes

## 2023-04-23 NOTE — DISCHARGE NOTE NURSING/CASE MANAGEMENT/SOCIAL WORK - NSDCFUADDAPPT_GEN_ALL_CORE_FT
Please follow up with your PCP within 2 weeks of discharge from hospital. If you are unable to schedule an appointment then please call [phone] to schedule and appointment with a new physician.

## 2023-04-23 NOTE — PROVIDER CONTACT NOTE (OTHER) - DATE AND TIME:
22-Apr-2023 17:02
17-Apr-2023 12:01
17-Apr-2023 18:34
17-Apr-2023 19:48
18-Apr-2023 10:22
17-Apr-2023 10:00
18-Apr-2023 05:30
23-Apr-2023 00:38
23-Apr-2023 02:04
22-Apr-2023 09:01
17-Apr-2023 14:00
18-Apr-2023 13:20
18-Apr-2023 02:10
17-Apr-2023 21:30

## 2023-04-23 NOTE — PROVIDER CONTACT NOTE (OTHER) - BACKGROUND
Pt admitted for N/V/D
Pt admitted for N/V/D
Pt admitted for N/V/D.
Pt admitted for nausea and vomiting, has Hx of asthma and HTN.
Pt admitted for N/V/D
Pt admitted for N/V/D
Pt admitted with N/V/D and has pmhx of HTN and asthma.
Pt admitted with N/V/D and has pmhx of asthma and HTN.
Pt admitted for N/V/D.
Pt was admitted for vomiting and diarrhea
Pt admitted for N/V/D
Pt admitted for N/V/D
Pt was admitted for diarrhea
Pt admitted for N/V/D.

## 2023-04-23 NOTE — PROGRESS NOTE ADULT - ASSESSMENT
83F PMH HTN and asthma presents with acute onset diarrhea and vomiting each 10 times already since yesterday (Sunday). Patient now admitted for septic shock 83F PMH HTN and asthma presents with acute onset diarrhea and vomiting each 10 times already since yesterday (Sunday). Patient now admitted for septic shock, improved with IV antibiotics and fluid. Clinically improved, pending DC home with PO antibiotics.

## 2023-04-23 NOTE — PROVIDER CONTACT NOTE (OTHER) - ASSESSMENT
Pt BP is 100/38. Pt denies headache, chest pain or dizziness.
Pt BP is 92/56 and HR 64. Pt denies chest pain, headache or dizziness.
Pt's /47 HR 89
Pt's BP 99/51 HR 75
Pt is otherwise stable and asymptomatic
Pt is otherwise stable and asymptomatic
Pt's /50 HR 82
No acute distress at this time. Pt asymptomatic
Pt alert and oriented. Pt asymptomatic at this time.
Pt appears calm, and denies dizziness. No s/s of distress noted. Pt's HR is 70.
Pt's VSS, no acute distress.
Pt is complaining of abdominal discomfort. pt stated discomfort stated after taking KCL pills.
Pt's /48 HR 89, no acute distress.
Unable to collect urine, pt has BM at the time of urination. Pt also refused primafit.

## 2023-04-23 NOTE — DISCHARGE NOTE NURSING/CASE MANAGEMENT/SOCIAL WORK - PATIENT PORTAL LINK FT
You can access the FollowMyHealth Patient Portal offered by [facility] by registering at the following website: [email or web address]. By joining [email or web address]’s FollowMyHealth portal, you will also be able to view your health information using other applications (apps) compatible with our system.

## 2023-04-23 NOTE — PROVIDER CONTACT NOTE (OTHER) - REASON
BP 94/50
Pt's /50 HR 82
/53
BP of 94/50
Pt refused Potassium chloride tablet.
Unable to collect urine, pt has BM at the time of urination.
Pts BP is 101/54
Pt is complaining of abdominal discomfort.
Pt's /48 HR 89
Pt BP is 100/38.
Pt BP is 92/56
Pt's BP 99/51 HR 75
Pt's /47 HR 89
Pts /51

## 2023-04-24 LAB
CULTURE RESULTS: SIGNIFICANT CHANGE UP
CULTURE RESULTS: SIGNIFICANT CHANGE UP
SPECIMEN SOURCE: SIGNIFICANT CHANGE UP
SPECIMEN SOURCE: SIGNIFICANT CHANGE UP

## 2023-11-01 NOTE — ED PROVIDER NOTE - INTERNATIONAL TRAVEL COUNTRIES
Loop Recorder Text A Cab   Watch your incision site for signs and symptoms of infection including swelling, increased redness, fevers, chills, or drainage from the incision. If you experience any of these symptoms call your doctor's office IMMEDIATELY   Remove clear tape and white gauze from the incision in 3 days.  You will have a follow up appointment to check your incision site.  You may shower 3 days after today.  Sponge bathe until then  You can used Tylenol as needed for pain  Keep your temporary card with you until your permanent one comes in the mail  Keep your home monitor plugged in at all times having the unit no more than 6 feet from your bedside  Using the remote assist device report any signs and symptoms ie dizziness, palpitations, fainting, moderate to severe pain  
[place]

## 2024-09-11 NOTE — PROGRESS NOTE ADULT - PROBLEM SELECTOR PLAN 1
H&P reviewed.  The patient was examined and there are no changes to the H&P -Dx: Septic shock 2/2 salmonella  -presented w/ 10x episodes of vomiting after recent flight from [place]  -s/p 4 x 1L bolus, MAP 50-60 despite aggressive IVF given earlier in ED  -Noted lactic acidosis   -s/p vanc/zosyn in ED  -MICU consulted for hypotension; at this time not MICU candidate  -CT A/P w/o colitis, noted pelvic fluid mass (potential infectious vs neoplastic source)  -Pelvic US confirming pelvic fluid mass  -MRSA swab (-), (+) MSSA colonization   -BCx (+) for salmonella  > Transitioned from Zosyn (4/17-4/18) Flagyl/Ceftriaxone (4/18-)  > f/u r/p BCx for bacterial clearance  > family opting to investigate [place] as [place] -Dx: Septic shock 2/2 salmonella  -presented w/ 10x episodes of vomiting after recent flight from [place]  -s/p 4 x 1L bolus, MAP 50-60 despite aggressive IVF given earlier in ED  -Noted lactic acidosis   -s/p vanc/zosyn in ED  -MICU consulted for hypotension; at this time not MICU candidate  -CT A/P w/o colitis, noted pelvic fluid mass (potential infectious vs neoplastic source)  -Pelvic US confirming pelvic fluid mass  -MRSA swab (-), (+) MSSA colonization   -BCx (+) for salmonella  > Transitioned from Zosyn (4/17-4/18) Flagyl/Ceftriaxone (4/18-)  > f/u r/p BCx for bacterial clearance  > will require TTE  > family opting to investigate pelvic mass overseas as [place]